# Patient Record
Sex: MALE | Race: WHITE | NOT HISPANIC OR LATINO | Employment: FULL TIME | URBAN - METROPOLITAN AREA
[De-identification: names, ages, dates, MRNs, and addresses within clinical notes are randomized per-mention and may not be internally consistent; named-entity substitution may affect disease eponyms.]

---

## 2018-01-13 NOTE — PROGRESS NOTES
Assessment    1  Hepatitis C (070 70) (B19 20)   2  Hypertension (401 9) (I10)   3  Nicotine dependence (305 1) (F17 200)   4  Atopic dermatitis (691 8) (L20 9)    Plan  Abnormal liver function test, Atopic dermatitis    · Follow-up visit in 6 months Evaluation and Treatment  Follow-up  Status: Hold For - Scheduling   Requested for: 20Jan2016    Discussion/Summary  Discussion Summary:   #1  Hepatitis C  As mentioned patient's medical supplier will be pain for treatment and he will begin this later in the week  Patient will discuss possible side effects and surveillance of treatment with his gastroenterologist  Again no family member has also been found to be positive  #2  Hypertension  Patient's blood pressure showing relatively good control and this will monitor  And advantages the patient has quitting smoking and this may help to reduce his elevated blood pressure  Patient is also under less anxiety now that he knows that he can perceive treatment for his hepatitis  #3  nicotinic dependence  As of today patient states he's quitting smoking and was told if he needs resources to help him quit please call  #4  Atopic dermatitis  Patient is having some improvement with topical steroids but persistent rash still needs further diagnosis  Patient has an appointment in a month to see dermatology  Counseling Documentation With Imm: The patient was counseled regarding diagnostic results, instructions for management, risk factor reductions, prognosis, patient and family education, impressions, risks and benefits of treatment options, importance of compliance with treatment  Medication SE Review and Pt Understands Tx: Possible side effects of new medications were reviewed with the patient/guardian today  The treatment plan was reviewed with the patient/guardian   The patient/guardian understands and agrees with the treatment plan      Chief Complaint  Chief Complaint Free Text Note Form: Patient is here for his 4 week follow up  No labs required for this visit  History of Present Illness  HPI: Patient is a 71-year-old male with a history of borderline hypertension, recently diagnosed with hepatitis C, patient is here today for routine followup after form of period time  Since patient was seen last he has been interviewed by a gastroenterologist in the patient did learn yesterday that he is eligible for treatment for his hepatitis C by his medical provider and he will be getting the medication and the male in the near future  Patient was also relieved to find out that his wife does not have hepatitis C for in general the patient is feeling well with no new complaints or problems   Hospital Based Practices Required Assessment:   Pain Assessment   the patient states they do not have pain  Abuse And Domestic Violence Screen    Yes, the patient is safe at home  The patient states no one is hurting them  Depression And Suicide Screen  No, the patient has not had thoughts of hurting themself  No, the patient has not felt depressed in the past 7 days  Primary Language is  English  Readiness To Learn: Receptive  Barriers To Learning: none  Preferred Learning: verbal   Education Completed: disease/condition, medications and further treatment/follow-up   Teaching Method: verbal   Person Taught: patient   Evaluation Of Learning: verbalized/demonstrated understanding      Review of Systems  Complete-Male:   Constitutional: No fever or chills, feels well, no tiredness, no recent weight gain or weight loss  Eyes: No complaints of eye pain, no red eyes, no discharge from eyes, no itchy eyes  ENT: no complaints of earache, no hearing loss, no nosebleeds, no nasal discharge, no sore throat, no hoarseness  Cardiovascular: No complaints of slow heart rate, no fast heart rate, no chest pain, no palpitations, no leg claudication, no lower extremity     Respiratory: No complaints of shortness of breath, no wheezing, no cough, no SOB on exertion, no orthopnea or PND  Gastrointestinal: No complaints of abdominal pain, no constipation, no nausea or vomiting, no diarrhea or bloody stools  Genitourinary: No complaints of dysuria, no incontinence, no hesitancy, no nocturia, no genital lesion, no testicular pain  Musculoskeletal: arthralgias and joint stiffness, but no joint swelling, no limb pain, no myalgias and no limb swelling  Integumentary: a rash and itching, but as noted in HPI, no dry skin, no skin lesions and no skin wound  Neurological: No compliants of headache, no confusion, no convulsions, no numbness or tingling, no dizziness or fainting, no limb weakness, no difficulty walking  Psychiatric: anxiety, but not suicidal, no personality change, no sleep disturbances and no emotional problems  Endocrine: No complaints of proptosis, no hot flashes, no muscle weakness, no erectile dysfunction, no deepening of the voice, no feelings of weakness  Hematologic/Lymphatic: No complaints of swollen glands, no swollen glands in the neck, does not bleed easily, no easy bruising  Active Problems    1  Abnormal liver function test (790 6) (R79 89)   2  Atopic dermatitis (691 8) (L20 9)   3  Hepatitis C (070 70) (B19 20)   4  Hyperglycemia (790 29) (R73 9)   5  Hypertension (401 9) (I10)   6  Internal derangement of right shoulder (719 81) (M24 111)   7  Nicotine dependence (305 1) (F17 200)   8  Peripheral arterial disease (443 9) (I73 9)   9  Right shoulder pain (719 41) (M25 511)   10  Rotator cuff tear, right (840 4) (M75 101)   11  Sinusitis (473 9) (J32 9)   12  Sprain of shoulder, right (840 9) (S43 401A)   13  Stroke Syndrome (436)   14  Tobacco use (305 1) (Z72 0)   15  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)    Surgical History    1  History of Rotator Cuff Repair   2  History of Shoulder Surgery    Family History    1  Family history of Benign Brain Tumor    2   Family history of Cerebral Artery Aneurysm    Social History    · Alcohol Use (History)   · Denied: History of Drug Use   · Tobacco use (305 1) (Z72 0)   · Tobacco use (305 1) (Z72 0)    Current Meds   1  Aleve 220 MG Oral Tablet; Therapy: (Recorded:25Mar2015) to Recorded   2  Cyclobenzaprine HCl - 10 MG Oral Tablet; TAKE 1 TABLET AT BEDTIME; Therapy: [id number] to [phone] 13)  Requested for: 38XVR4568; Last Rx:25Mar2015;   Status: ACTIVE - Renewal Denied Ordered   3  Fluocinonide 0 05 % External Ointment; APPLY SPARINGLY TO AFFECTED AREA(S) TWICE DAILY; Therapy: [id number] to (Gabriela Beams)  Requested for: 44VCB7272; Last Rx:06Nov2015   Ordered   4  MethylPREDNISolone Acetate 40 MG/ML Injection Suspension; 1 mg was given in the left deltoid IM; To Be Done: [id number]; Status: HOLD FOR - Administration Ordered    Allergies    1  Vicodin TABS    Vitals  Vital Signs [Data Includes: Current Encounter]    Recorded: 36ETR6517 09:16AM   Temperature 95 3 F   Heart Rate 74   Respiration 18   Systolic 286   Diastolic 82   Height 5 ft 8 in   Weight 174 lb 6 08 oz   BMI Calculated 26 51   BSA Calculated 1 93   O2 Saturation 98     Physical Exam    Constitutional Pleasant healthy-appearing 67-year-old male who is awake alert in no acute distress  Eyes   Conjunctiva and lids: No swelling, erythema, or discharge  Pupils and irises: Equal, round and reactive to light  Ears, Nose, Mouth, and Throat   External inspection of ears and nose: Normal     Otoscopic examination: Tympanic membrance translucent with normal light reflex  Canals patent without erythema  Nasal mucosa, septum, and turbinates: Normal without edema or erythema  Oropharynx: Normal with no erythema, edema, exudate or lesions  Pulmonary   Respiratory effort: No increased work of breathing or signs of respiratory distress  Auscultation of lungs: Clear to auscultation, equal breath sounds bilaterally, no wheezes, no rales, no rhonci      Cardiovascular   Palpation of heart: Normal PMI, no thrills  Auscultation of heart: Normal rate and rhythm, normal S1 and S2, without murmurs  Examination of extremities for edema and/or varicosities: Normal     Carotid pulses: Normal     Abdomen   Abdomen: Non-tender, no masses  Liver and spleen: No hepatomegaly or splenomegaly  Lymphatic   Palpation of lymph nodes in neck: No lymphadenopathy  Musculoskeletal   Gait and station: Normal     Digits and nails: Normal without clubbing or cyanosis  Inspection/palpation of joints, bones, and muscles: Normal     Skin   Skin and subcutaneous tissue: Normal without rashes or lesions  Examination of the skin for lesions: Abnormal   Diffuse macular papular raised rash to all parts of his body  There is no sparing of any particular areas  Patient states she's had biopsies obtained in the past were negative  Neurologic   Cranial nerves: Cranial nerves 2-12 intact  Reflexes: 2+ and symmetric  Sensation: No sensory loss      Psychiatric   Orientation to person, place and time: Normal     Mood and affect: Normal          Signatures   Electronically signed by : Oscar Payan DO; Jan 20 2016 10:08AM EST                       (Author)

## 2018-03-09 ENCOUNTER — OFFICE VISIT (OUTPATIENT)
Dept: INTERNAL MEDICINE CLINIC | Facility: CLINIC | Age: 63
End: 2018-03-09
Payer: COMMERCIAL

## 2018-03-09 VITALS
DIASTOLIC BLOOD PRESSURE: 70 MMHG | TEMPERATURE: 98.1 F | WEIGHT: 179 LBS | HEIGHT: 68 IN | BODY MASS INDEX: 27.13 KG/M2 | HEART RATE: 75 BPM | SYSTOLIC BLOOD PRESSURE: 154 MMHG | OXYGEN SATURATION: 97 %

## 2018-03-09 DIAGNOSIS — B19.20 HEPATITIS C VIRUS INFECTION WITHOUT HEPATIC COMA, UNSPECIFIED CHRONICITY: ICD-10-CM

## 2018-03-09 DIAGNOSIS — F41.1 GENERALIZED ANXIETY DISORDER: ICD-10-CM

## 2018-03-09 DIAGNOSIS — F17.210 CIGARETTE NICOTINE DEPENDENCE WITHOUT COMPLICATION: ICD-10-CM

## 2018-03-09 DIAGNOSIS — L20.84 INTRINSIC ATOPIC DERMATITIS: ICD-10-CM

## 2018-03-09 DIAGNOSIS — I10 ESSENTIAL HYPERTENSION: Primary | ICD-10-CM

## 2018-03-09 DIAGNOSIS — R21 SKIN RASH: ICD-10-CM

## 2018-03-09 PROCEDURE — 99213 OFFICE O/P EST LOW 20 MIN: CPT | Performed by: INTERNAL MEDICINE

## 2018-03-09 RX ORDER — CEPHALEXIN 500 MG/1
500 CAPSULE ORAL EVERY 12 HOURS SCHEDULED
Qty: 20 CAPSULE | Refills: 0 | Status: SHIPPED | OUTPATIENT
Start: 2018-03-09 | End: 2018-03-19

## 2018-03-09 NOTE — ASSESSMENT & PLAN NOTE
Hypertension  As noted patient's blood pressure is mildly elevated  Patient will have this checked in approximately 2-3 weeks with his next visit and if it is still elevated the nurse no abnormalities with lab test otherwise we will initiate treatment

## 2018-03-09 NOTE — PROGRESS NOTES
Assessment/Plan:      Diagnoses and all orders for this visit:    Essential hypertension  -     Comprehensive metabolic panel; Future  -     CBC and differential; Future    Skin rash  -     Ambulatory referral to Dermatology; Future  -     cephalexin (KEFLEX) 500 mg capsule; Take 1 capsule (500 mg total) by mouth every 12 (twelve) hours for 10 days    Hepatitis C virus infection without hepatic coma, unspecified chronicity    Intrinsic atopic dermatitis    Generalized anxiety disorder    Cigarette nicotine dependence without complication          Subjective:     Patient ID: Yuli Acevedo is a 65 y o  male  Patient is a 71-year-old male with a history of benign essential hypertension, history of hepatitis C infection in the past now in remission, history of anxiety, chronic tobacco abuse  Patient is here today for evaluation of a rash diffusely throughout his body  He has had this rash before and was original Work workup did shows liver function abnormalities and finally a diagnosis of hepatitis-C he is now in remission but still has the rash  He states his pruritic and occasionally he has little pustules to the skin surface  It does not seem to be seasonal   He has been on systemic corticosteroids without relief  Review of Systems   Constitutional: Negative  HENT: Negative  Eyes: Negative  Respiratory: Negative  Cardiovascular: Negative  Gastrointestinal: Negative  Endocrine: Negative  Genitourinary: Negative  Musculoskeletal: Negative  Skin: Positive for rash  Negative for color change, pallor and wound  Allergic/Immunologic: Negative  Neurological: Negative  Hematological: Negative  Psychiatric/Behavioral: Negative  Objective:     Physical Exam   Constitutional: He is oriented to person, place, and time  He appears well-developed and well-nourished     Pleasant, mildly anxious 71-year-old male who is awake alert no acute distress   HENT:   Head: Normocephalic and atraumatic  Right Ear: External ear normal    Left Ear: External ear normal    Nose: Nose normal    Mouth/Throat: Oropharynx is clear and moist    Eyes: Conjunctivae and EOM are normal  Pupils are equal, round, and reactive to light  Neck: Normal range of motion  Neck supple  Cardiovascular: Normal rate, regular rhythm and intact distal pulses  Pulmonary/Chest: Effort normal    Patient has slightly decreased breath sounds anterior and posteriorly but no rales rhonchi or wheezes   Abdominal: Soft  Bowel sounds are normal    Musculoskeletal: Normal range of motion  Neurological: He is alert and oriented to person, place, and time  He has normal reflexes  Skin: Skin is warm  Rash noted  No erythema  No pallor  Psychiatric: He has a normal mood and affect  His behavior is normal  Judgment and thought content normal    Nursing note and vitals reviewed

## 2018-03-09 NOTE — ASSESSMENT & PLAN NOTE
Hepatitis-C  Again with workup and evaluation for his dermatitis in the past we did discover the patient had hepatitis C and patient was seen by a gastroenterologist and initiated treatment  Patient is now considered disease-free  We will be checking on his liver function tests with upcoming labs

## 2018-03-09 NOTE — ASSESSMENT & PLAN NOTE
Nicotine dependence  Patient is still smoking  He was told the importance of quitting now    We will discuss this again with his next visit

## 2018-03-09 NOTE — ASSESSMENT & PLAN NOTE
Atopic dermatitis, skin rash  Patient was seen previously in our office for diffuse skin rash which is pruritic  Patient states that he develops small pustules and his rash will come and go  He states it has been particularly difficult this winter  With initial workup and evaluation patient did have routine labs performed which showed elevated liver function test consistent with then diagnosis hepatitis-C  Patient did have some systemic steroids which did not help his illness  We did discuss different ways to work on the skin rash including using different detergents and soaps at home which she will attempt to do  Patient was also placed on oral antibiotic treatment to see if this is a chronic staff type infection  He will go for routine labs and we will discuss the results when he comes back approximately 2 weeks  We have also made an appointment to be seen by a dermatologist and this may take an extended period of time so this was initiated early and we felt that we could cancel the appointment if he has a cure

## 2018-04-02 ENCOUNTER — OFFICE VISIT (OUTPATIENT)
Dept: INTERNAL MEDICINE CLINIC | Facility: CLINIC | Age: 63
End: 2018-04-02
Payer: COMMERCIAL

## 2018-04-02 VITALS
BODY MASS INDEX: 26.22 KG/M2 | OXYGEN SATURATION: 97 % | WEIGHT: 173 LBS | TEMPERATURE: 97.4 F | SYSTOLIC BLOOD PRESSURE: 138 MMHG | HEART RATE: 75 BPM | HEIGHT: 68 IN | DIASTOLIC BLOOD PRESSURE: 80 MMHG

## 2018-04-02 DIAGNOSIS — L20.84 INTRINSIC ATOPIC DERMATITIS: ICD-10-CM

## 2018-04-02 DIAGNOSIS — B19.20 HEPATITIS C VIRUS INFECTION WITHOUT HEPATIC COMA, UNSPECIFIED CHRONICITY: Primary | ICD-10-CM

## 2018-04-02 DIAGNOSIS — F41.1 GENERALIZED ANXIETY DISORDER: ICD-10-CM

## 2018-04-02 DIAGNOSIS — I10 ESSENTIAL HYPERTENSION: ICD-10-CM

## 2018-04-02 DIAGNOSIS — Z12.11 SCREEN FOR COLON CANCER: ICD-10-CM

## 2018-04-02 DIAGNOSIS — Z12.5 PROSTATE CANCER SCREENING: ICD-10-CM

## 2018-04-02 DIAGNOSIS — F17.210 CIGARETTE NICOTINE DEPENDENCE WITHOUT COMPLICATION: ICD-10-CM

## 2018-04-02 DIAGNOSIS — R35.0 URINARY FREQUENCY: ICD-10-CM

## 2018-04-02 PROCEDURE — 99214 OFFICE O/P EST MOD 30 MIN: CPT | Performed by: INTERNAL MEDICINE

## 2018-04-02 NOTE — PROGRESS NOTES
Assessment/Plan:    Hypertension  Hypertension  As noted patient's blood pressure was moderately elevated with his last visit  Has noted patient's blood pressure today is showing better control and patient will remain off the medication at this point in time  He did have routine labs performed prior to the visit today showing no abnormalities  Hepatitis C without hepatic coma  Patient does have a history in the past of hepatitis-C and did go through complete treatment cycle  Patient has no elevation in any of his liver function tests    Atopic dermatitis  Atopic dermatitis  Patient has finally had an appointment to be seen and evaluation by the dermatologist   Patient was placed on try minutes alone cream and patient states he has had almost complete resolution of his rash and pruritus    Nicotine dependence  Nicotine dependence  We did have a long discussion today about quitting smoking  Patient is willing to try the nicotine patch  We did give him samples of the patch with a coupon for these  He was told if any adverse reaction to the medication to please call  He was told another option would be to place him on Chantix and we have had some success with this medication previously  Diagnoses and all orders for this visit:    Hepatitis C virus infection without hepatic coma, unspecified chronicity    Essential hypertension    Intrinsic atopic dermatitis    Generalized anxiety disorder    Cigarette nicotine dependence without complication    Prostate cancer screening  -     PSA Total, Diagnostic; Future    Urinary frequency  -     Urinalysis with microscopic    Screen for colon cancer  -     Occult blood 1-3, stool; Future    Other orders  -     triamcinolone (KENALOG) 0 1 % ointment;           Subjective:      Patient ID: Beba Chawla is a 65 y o  male  Patient is a 61-year-old male with a history of multiple medical problems as outlined previously    Patient is here today for follow-up regarding his blood pressure, disseminated atopic dermatitis, nicotine dependence  The following portions of the patient's history were reviewed and updated as appropriate:   He  has no past medical history on file  He   Patient Active Problem List    Diagnosis Date Noted    Generalized anxiety disorder 09/02/2016    Hepatitis C without hepatic coma 12/28/2015    Hypertension 11/06/2015    Atopic dermatitis 11/06/2015    Nicotine dependence 08/23/2012     He  has no past surgical history on file  His family history is not on file  He  has no tobacco, alcohol, and drug history on file  Current Outpatient Prescriptions   Medication Sig Dispense Refill    triamcinolone (KENALOG) 0 1 % ointment        No current facility-administered medications for this visit  No current outpatient prescriptions on file prior to visit  No current facility-administered medications on file prior to visit  He is allergic to hydrocodone-acetaminophen       Review of Systems   Constitutional: Negative for activity change, appetite change, chills, diaphoresis, fatigue, fever and unexpected weight change  HENT: Negative for congestion, dental problem, drooling, ear discharge, ear pain, facial swelling, hearing loss, mouth sores, nosebleeds, postnasal drip, rhinorrhea, sinus pain, sinus pressure, sneezing, sore throat, tinnitus, trouble swallowing and voice change  Eyes: Negative for photophobia, pain, discharge, redness, itching and visual disturbance  Respiratory: Positive for cough (Episodic cough from chronic tobacco abuse)  Negative for apnea, choking, chest tightness, shortness of breath, wheezing and stridor  Cardiovascular: Negative for chest pain, palpitations and leg swelling  Gastrointestinal: Negative for abdominal distention, abdominal pain, anal bleeding, blood in stool, constipation, diarrhea, nausea, rectal pain and vomiting     Endocrine: Negative for cold intolerance, heat intolerance, polydipsia, polyphagia and polyuria  Genitourinary: Negative for difficulty urinating, discharge, dysuria, enuresis, flank pain, frequency, genital sores, hematuria, penile pain, penile swelling, scrotal swelling, testicular pain and urgency  Musculoskeletal: Negative for arthralgias, back pain, gait problem, joint swelling, myalgias, neck pain and neck stiffness  Skin: Positive for rash ( patient states that he has had almost complete resolution of his rash with treatment as proposed by his dermatologist)  Negative for color change, pallor and wound  Allergic/Immunologic: Negative for environmental allergies, food allergies and immunocompromised state  Neurological: Negative  Hematological: Negative  Psychiatric/Behavioral: Negative for agitation, behavioral problems, confusion, decreased concentration, dysphoric mood, hallucinations, self-injury, sleep disturbance and suicidal ideas  The patient is nervous/anxious ( patient states that much of his anxiety was based on problems with his recurrent rash and since this has improved he is feeling much better emotionally as well)  The patient is not hyperactive  Objective:      /80   Pulse 75   Temp (!) 97 4 °F (36 3 °C)   Ht 5' 8" (1 727 m)   Wt 78 5 kg (173 lb)   SpO2 97%   BMI 26 30 kg/m²          Physical Exam   Constitutional: He is oriented to person, place, and time  He appears well-developed and well-nourished  Pleasant, healthy-appearing 41-year-old male who is awake alert in no acute distress   HENT:   Head: Normocephalic and atraumatic  Right Ear: External ear normal    Left Ear: External ear normal    Nose: Nose normal    Mouth/Throat: Oropharynx is clear and moist    Eyes: Conjunctivae and EOM are normal  Pupils are equal, round, and reactive to light  Neck: Normal range of motion  Neck supple  Cardiovascular: Normal rate, regular rhythm, normal heart sounds and intact distal pulses      Pulmonary/Chest: No respiratory distress  He has no wheezes  He has no rales  He exhibits no tenderness  Patient has some decreased breath sounds anterior and posteriorly but no rales rhonchi or wheezes were appreciated on exam   Abdominal: Soft  Bowel sounds are normal    Genitourinary:   Genitourinary Comments: Patient was interested in evaluation of his prostatic health but refused rectal examination   Musculoskeletal: Normal range of motion  He exhibits no edema, tenderness or deformity  Neurological: He is alert and oriented to person, place, and time  Skin: Skin is warm and dry  Psychiatric: He has a normal mood and affect  His behavior is normal  Judgment and thought content normal    Nursing note and vitals reviewed

## 2018-04-02 NOTE — ASSESSMENT & PLAN NOTE
Nicotine dependence  We did have a long discussion today about quitting smoking  Patient is willing to try the nicotine patch  We did give him samples of the patch with a coupon for these  He was told if any adverse reaction to the medication to please call  He was told another option would be to place him on Chantix and we have had some success with this medication previously

## 2018-04-02 NOTE — ASSESSMENT & PLAN NOTE
Hypertension  As noted patient's blood pressure was moderately elevated with his last visit  Has noted patient's blood pressure today is showing better control and patient will remain off the medication at this point in time  He did have routine labs performed prior to the visit today showing no abnormalities

## 2018-04-02 NOTE — ASSESSMENT & PLAN NOTE
Atopic dermatitis    Patient has finally had an appointment to be seen and evaluation by the dermatologist   Patient was placed on try minutes alone cream and patient states he has had almost complete resolution of his rash and pruritus

## 2018-04-02 NOTE — ASSESSMENT & PLAN NOTE
Patient does have a history in the past of hepatitis-C and did go through complete treatment cycle    Patient has no elevation in any of his liver function tests

## 2018-08-13 ENCOUNTER — TELEPHONE (OUTPATIENT)
Dept: INTERNAL MEDICINE CLINIC | Facility: CLINIC | Age: 63
End: 2018-08-13

## 2018-08-13 DIAGNOSIS — N32.89 BLADDER MASS: Primary | ICD-10-CM

## 2018-08-13 NOTE — TELEPHONE ENCOUNTER
Patient had appendicitis last Thursday and went to ER, he ended up having his appendic's taken out and in the process they found a mass that he would like to discuss with you    Please call him today he is really anxious to speak with you

## 2018-08-15 ENCOUNTER — TELEPHONE (OUTPATIENT)
Dept: UROLOGY | Facility: AMBULATORY SURGERY CENTER | Age: 63
End: 2018-08-15

## 2018-08-15 ENCOUNTER — OFFICE VISIT (OUTPATIENT)
Dept: INTERNAL MEDICINE CLINIC | Facility: CLINIC | Age: 63
End: 2018-08-15
Payer: COMMERCIAL

## 2018-08-15 ENCOUNTER — TRANSITIONAL CARE MANAGEMENT (OUTPATIENT)
Dept: INTERNAL MEDICINE CLINIC | Facility: CLINIC | Age: 63
End: 2018-08-15

## 2018-08-15 ENCOUNTER — TELEPHONE (OUTPATIENT)
Dept: INTERNAL MEDICINE CLINIC | Facility: CLINIC | Age: 63
End: 2018-08-15

## 2018-08-15 VITALS
HEART RATE: 70 BPM | HEIGHT: 68 IN | WEIGHT: 175 LBS | TEMPERATURE: 97.1 F | BODY MASS INDEX: 26.52 KG/M2 | DIASTOLIC BLOOD PRESSURE: 70 MMHG | OXYGEN SATURATION: 96 % | SYSTOLIC BLOOD PRESSURE: 138 MMHG | RESPIRATION RATE: 16 BRPM

## 2018-08-15 DIAGNOSIS — I10 ESSENTIAL HYPERTENSION: Primary | ICD-10-CM

## 2018-08-15 DIAGNOSIS — Z90.49 STATUS POST APPENDECTOMY: ICD-10-CM

## 2018-08-15 DIAGNOSIS — N32.89 BLADDER MASS: ICD-10-CM

## 2018-08-15 PROCEDURE — 99496 TRANSJ CARE MGMT HIGH F2F 7D: CPT | Performed by: NURSE PRACTITIONER

## 2018-08-15 NOTE — PROGRESS NOTES
Assessment/Plan:    Hypertension  Blood pressure stable, pt not taking any medications  Status post appendectomy  Pt is recovering well from surgery  Incisions are clean and dry  He does have significant bruising but this appears to be consistent with localized trauma secondary to his procedure  Pt is scheduled to follow up with surgery next week  Pt advised to take pain medication as needed; he states his discharge instructions were for ibuprofen  Bladder mass  Referral to [facility] urology provided, office staff assisted pt to call and try to schedule appointment  Diagnoses and all orders for this visit:    Essential hypertension    Status post appendectomy    Bladder mass          Subjective:      Patient ID: Manju Jerome is a 65 y o  male  Pt is a 65y o  year old male who is here for TCM visit following hospitalization at [facility] from 8/9-8/10 with discharge to Home   Admission diagnosis was acute appendicitis  Discharge medications reviewed yes  Pt is experiencing symptoms of tenderness and bruising at his incision site  He has not taken any pain medication  He denies fever/chills, nausea/vomiting, diarrhea/constipation  Appetite is normal   An incidental finding of a bladder mass was identified on CT done during his hospitalization and was referred for cystoscopy with urology  Report does identify this mass as suspicious for malignancy  Pt has had hematuria in the past             The following portions of the patient's history were reviewed and updated as appropriate: allergies, current medications, past family history, past medical history, past social history, past surgical history and problem list     Review of Systems   Constitutional: Negative for activity change, appetite change, chills, fatigue, fever and unexpected weight change  HENT: Negative for congestion and hearing loss  Eyes: Negative for visual disturbance     Respiratory: Negative for cough, chest tightness and shortness of breath  Cardiovascular: Negative for chest pain, palpitations and leg swelling  Gastrointestinal: Positive for abdominal pain  Negative for constipation, diarrhea, nausea and vomiting  Genitourinary: Positive for hematuria  Negative for dysuria and frequency  Musculoskeletal: Negative for arthralgias and myalgias  Skin: Positive for color change (bruising) and wound  Allergic/Immunologic: Negative for environmental allergies  Neurological: Negative for dizziness, weakness, numbness and headaches  Psychiatric/Behavioral: Negative for sleep disturbance  The patient is not nervous/anxious  Objective:      /70 (BP Location: Left arm, Patient Position: Sitting, Cuff Size: Large)   Pulse 70   Temp (!) 97 1 °F (36 2 °C)   Resp 16   Ht 5' 8" (1 727 m)   Wt 79 4 kg (175 lb)   SpO2 96%   BMI 26 61 kg/m²          Physical Exam   Constitutional: He is oriented to person, place, and time  Vital signs are normal  He appears well-developed and well-nourished  He is cooperative  HENT:   Right Ear: Hearing, tympanic membrane, external ear and ear canal normal    Left Ear: Hearing, tympanic membrane, external ear and ear canal normal    Nose: Nose normal    Mouth/Throat: Oropharynx is clear and moist and mucous membranes are normal    Eyes: Conjunctivae and lids are normal  Pupils are equal, round, and reactive to light  Neck: Normal range of motion and full passive range of motion without pain  No JVD present  Carotid bruit is not present  Cardiovascular: Normal rate, regular rhythm, S1 normal, S2 normal, normal heart sounds and intact distal pulses  No murmur heard  Pulmonary/Chest: Effort normal and breath sounds normal    Abdominal: Soft  Bowel sounds are normal  There is tenderness in the left lower quadrant  Incisions as noted on diagram   All are covered in clear adhesive dressings  No abnormal discharge, no increased warmth    The LLQ incision is tender with significant bruising around it that tracks around the L hip to the back  Mild tenderness to palpation over incision site  Musculoskeletal: Normal range of motion  He exhibits no edema  Lymphadenopathy:     He has no cervical adenopathy  Neurological: He is alert and oriented to person, place, and time  He has normal strength  No sensory deficit  Skin: Skin is warm, dry and intact  Ecchymosis and lesion noted  Deep purple bruising as noted on diagram   Surgical incisions on abdomen are clean and dry with clear adhesive dressings  Psychiatric: He has a normal mood and affect  His speech is normal and behavior is normal  Judgment and thought content normal  Cognition and memory are normal    Vitals reviewed  Date and time hospital follow up call was made:  8/15/2018  1:10 PM  Hospital care reviewed:  Records reviewed  Patient was hopsitalized at:  [facility]  Date of admission:  8/9/18  Date of discharge:  8/10/18  Diagnosis:  Appendix removal  Disposition:  Home  Current symptoms:  Back pain - left side, Back pain - right side, Incisional pain  Back pain, left side, severity:  Moderate  Back pain, left side, onset:  Progressive  Incisional pain severity:  Moderate  Back pain, right side, severity:  Moderate  Back pain, right side, onset:  Progressive  Scheduled for follow up?:  Yes  I have advised the patient to call PCP with any new or worsening symptoms (please type in name along with any credentials):   Nathan Head  Comments:  Patient appt scheduled for 8/15/18

## 2018-08-15 NOTE — TELEPHONE ENCOUNTER
Reason for appointment/Complaint/Diagnosis : mass on bladder (cat scan in Care Everywhere)     Insurance: [employer]     History of Cancer? i spoke with PCPs office   If yes, what kind? Previous urologist?     unknown                   Records requested/where? Records in [address] [place] [address] Rd     Outside testing/where? In Epic     Location Preference for office visit?  [place]

## 2018-08-15 NOTE — TELEPHONE ENCOUNTER
Pt asked for a recommendation of the physician regarding the bladder procedure that he has to undergo  He asked for a call back at [phone] when possible

## 2018-08-15 NOTE — ASSESSMENT & PLAN NOTE
Blood pressure stable, pt not taking any medications 
Pt is recovering well from surgery  Incisions are clean and dry  He does have significant bruising but this appears to be consistent with localized trauma secondary to his procedure  Pt is scheduled to follow up with surgery next week  Pt advised to take pain medication as needed; he states his discharge instructions were for ibuprofen 
Referral to [facility] provided, office staff assisted pt to call and try to schedule appointment 
52.5

## 2018-08-15 NOTE — TELEPHONE ENCOUNTER
Patient scheduled for Friday, 8/17/18 11:30 at [place] office with Dr Maulik Palacio  Patient instructed to arrive 15-20 minutes early to complete new patient paperwork       Patient instructed to bring CT disk to appointment

## 2018-08-17 ENCOUNTER — OFFICE VISIT (OUTPATIENT)
Dept: UROLOGY | Facility: AMBULATORY SURGERY CENTER | Age: 63
End: 2018-08-17
Payer: COMMERCIAL

## 2018-08-17 VITALS
WEIGHT: 176.8 LBS | DIASTOLIC BLOOD PRESSURE: 68 MMHG | SYSTOLIC BLOOD PRESSURE: 134 MMHG | HEART RATE: 72 BPM | BODY MASS INDEX: 26.8 KG/M2 | HEIGHT: 68 IN

## 2018-08-17 DIAGNOSIS — N32.89 BLADDER MASS: Primary | ICD-10-CM

## 2018-08-17 LAB
BACTERIA UR QL AUTO: ABNORMAL /HPF
BILIRUB UR QL STRIP: NEGATIVE
CLARITY UR: CLEAR
COLOR UR: YELLOW
GLUCOSE UR STRIP-MCNC: ABNORMAL MG/DL
HGB UR QL STRIP.AUTO: NEGATIVE
HYALINE CASTS #/AREA URNS LPF: ABNORMAL /LPF
KETONES UR STRIP-MCNC: NEGATIVE MG/DL
LEUKOCYTE ESTERASE UR QL STRIP: ABNORMAL
NITRITE UR QL STRIP: NEGATIVE
NON-SQ EPI CELLS URNS QL MICRO: ABNORMAL /HPF
PH UR STRIP.AUTO: 5.5 [PH] (ref 4.5–8)
PROT UR STRIP-MCNC: NEGATIVE MG/DL
RBC #/AREA URNS AUTO: ABNORMAL /HPF
SL AMB  POCT GLUCOSE, UA: 1000
SL AMB LEUKOCYTE ESTERASE,UA: NORMAL
SL AMB POCT BLOOD,UA: NORMAL
SL AMB POCT CLARITY,UA: CLEAR
SL AMB POCT COLOR,UA: YELLOW
SL AMB POCT KETONES,UA: NORMAL
SL AMB POCT NITRITE,UA: NORMAL
SL AMB POCT PH,UA: 5
SL AMB POCT SPECIFIC GRAVITY,UA: 1.01
SL AMB POCT URINE PROTEIN: NORMAL
SP GR UR STRIP.AUTO: 1.03 (ref 1–1.03)
UROBILINOGEN UR QL STRIP.AUTO: 0.2 E.U./DL
WBC #/AREA URNS AUTO: ABNORMAL /HPF

## 2018-08-17 PROCEDURE — 87086 URINE CULTURE/COLONY COUNT: CPT | Performed by: UROLOGY

## 2018-08-17 PROCEDURE — 88112 CYTOPATH CELL ENHANCE TECH: CPT | Performed by: PATHOLOGY

## 2018-08-17 PROCEDURE — 81002 URINALYSIS NONAUTO W/O SCOPE: CPT | Performed by: UROLOGY

## 2018-08-17 PROCEDURE — 99244 OFF/OP CNSLTJ NEW/EST MOD 40: CPT | Performed by: UROLOGY

## 2018-08-17 PROCEDURE — 81001 URINALYSIS AUTO W/SCOPE: CPT | Performed by: UROLOGY

## 2018-08-17 PROCEDURE — 52000 CYSTOURETHROSCOPY: CPT | Performed by: UROLOGY

## 2018-08-17 NOTE — LETTER
August 17, 2018     [place] DO [place]  [address]  [address] [place] [id number]    Patient: Pastora    YOB: 1955   Date of Visit: 8/17/2018       Dear Dr Scott Rao: Thank you for referring Pastora  to me for evaluation  Below are my notes for this consultation  If you have questions, please do not hesitate to call me  I look forward to following your patient along with you  Sincerely,        Robin Loredo MD        CC: No Recipients  Robin Loredo MD  8/17/2018 12:10 PM  Sign at close encounter  8/17/2018    Pastora   1955  [id number]        Assessment  Urothelial carcinoma the bladder    Plan  We discussed the findings at length  We reviewed his CT scan findings as well as his cystoscopic findings  They understand that he has urothelial carcinoma of the bladder  Fortunately the rest of his CT scan appeared normal   We discussed the technique, risks, benefits for cystoscopy with trans urethral resection  I would also recommend left ureteral catheter placement intraoperatively in order to protect his left ureter during the resection  He understands that this will be removed, however he may require Toscano catheterization postoperatively depending on the degree of resection necessary  All questions answered to their satisfaction  Consent was obtained for cystoscopy, transurethral resection bladder tumor, left ureteral catheterization  History of Present Illness  Gal Beauchamp is a 65 y o  male referred for evaluation of bladder lesion  He developed severe acute abdominal pain on 8/9/2018  He went to [address] [place] [address] in CT scan was performed  This revealed acute appendicitis as well as a 2 cm enhancing nodular mass in the bladder  Patient has a greater than 40 pack year smoking history  He denies any gross hematuria or other urinary symptoms  He has had prostate screening in the past with his primary care physician    His last PSA in 2016 was 0 4     I reviewed his CT images on disc, which reveals 2 cm enhancing lesion in the bladder, left posterior wall  There is no obvious urothelial lesion anywhere else in the urinary tract  Cystoscopy  Date/Time: 8/17/2018 11:51 AM  Performed by: Arsalan Gotti  Authorized by: Arsalan Gotti     Additional Procedure Details: Flexible cystoscopy note     The patient was prepped and draped in sterile fashion  2% lidocaine jelly was instilled per urethra   The 16 Indian flexible cystoscope was placed per urethra  No urethral or prostatic obstruction  Evaluation of the bladder reveals bilateral normal ureteral orifices  Adjacent to the left ureteral orifice, there is a greater than 2 cm papillary lesion along with multiple satellite lesions  The rest of the bladder was carefully evaluated and there were no other lesions noted   The scope was removed and the procedure was terminated  The patient tolerated the procedure well  Review of Systems  Review of Systems   Constitutional: Negative  HENT: Negative  Respiratory: Negative  Cardiovascular: Negative  Gastrointestinal: Negative  Genitourinary:        As per HPI   Musculoskeletal: Negative  Skin: Negative  Neurological: Negative  Hematological: Negative  Past Medical History  History reviewed  No pertinent past medical history  Past Social History  Past Surgical History:   Procedure Laterality Date    APPENDECTOMY         Past Family History  History reviewed  No pertinent family history  Past Social history  Social History     Social History    Marital status: /Civil Union     Spouse name: N/A    Number of children: N/A    Years of education: N/A     Occupational History    Not on file       Social History Main Topics    Smoking status: Current Every Day Smoker    Smokeless tobacco: Never Used    Alcohol use Yes      Comment: social    Drug use: No    Sexual activity: Not on file Other Topics Concern    Not on file     Social History Narrative    No narrative on file     History   Smoking Status    Current Every Day Smoker   Smokeless Tobacco    Never Used       Current Medications  No current outpatient prescriptions on file  No current facility-administered medications for this visit  Allergies  Allergies   Allergen Reactions    Hydrocodone-Acetaminophen        Past Medical History, Social History, Family History, medications and allergies were reviewed  Vitals  Vitals:    08/17/18 1112   BP: 134/68   Pulse: 72   Weight: 80 2 kg (176 lb 12 8 oz)   Height: 5' 8" (1 727 m)       Physical Exam  Physical Exam   Constitutional: He is oriented to person, place, and time  He appears well-developed and well-nourished  Cardiovascular: Normal rate and regular rhythm  Pulmonary/Chest: Effort normal and breath sounds normal    Abdominal: Soft  Genitourinary: Penis normal    Musculoskeletal: Normal range of motion  Neurological: He is alert and oriented to person, place, and time  Skin: Skin is warm, dry and intact  Psychiatric: He has a normal mood and affect  Vitals reviewed          Results  No results found for: PSA  Lab Results   Component Value Date    CALCIUM 9 4 08/18/2016     08/18/2016    K 4 3 08/18/2016    CO2 26 08/18/2016     08/18/2016    BUN 16 08/18/2016    CREATININE 0 90 08/18/2016     Lab Results   Component Value Date    WBC 8 3 08/18/2016    HGB 16 1 08/18/2016    HCT 50 1 (H) 08/18/2016    MCV 94 9 08/18/2016     08/18/2016

## 2018-08-17 NOTE — PROGRESS NOTES
8/17/2018    Beba Chawla  1955  [id number]        Assessment  Urothelial carcinoma the bladder    Plan  We discussed the findings at length  We reviewed his CT scan findings as well as his cystoscopic findings  They understand that he has urothelial carcinoma of the bladder  Fortunately the rest of his CT scan appeared normal   We discussed the technique, risks, benefits for cystoscopy with trans urethral resection  I would also recommend left ureteral catheter placement intraoperatively in order to protect his left ureter during the resection  He understands that this will be removed, however he may require Toscano catheterization postoperatively depending on the degree of resection necessary  All questions answered to their satisfaction  Consent was obtained for cystoscopy, transurethral resection bladder tumor, left ureteral catheterization  History of Present Illness  Katty Santana is a 65 y o  male referred for evaluation of bladder lesion  He developed severe acute abdominal pain on 8/9/2018  He went to [address] [place] [address] in CT scan was performed  This revealed acute appendicitis as well as a 2 cm enhancing nodular mass in the bladder  Patient has a greater than 40 pack year smoking history  He denies any gross hematuria or other urinary symptoms  He has had prostate screening in the past with his primary care physician  His last PSA in 2016 was 0 4  I reviewed his CT images on disc, which reveals 2 cm enhancing lesion in the bladder, left posterior wall  There is no obvious urothelial lesion anywhere else in the urinary tract  Cystoscopy  Date/Time: 8/17/2018 11:51 AM  Performed by: Warren Reis  Authorized by: Warern Reis     Additional Procedure Details: Flexible cystoscopy note     The patient was prepped and draped in sterile fashion  2% lidocaine jelly was instilled per urethra   The 16 Dominican flexible cystoscope was placed per urethra    No urethral or prostatic obstruction  Evaluation of the bladder reveals bilateral normal ureteral orifices  Adjacent to the left ureteral orifice, there is a greater than 2 cm papillary lesion along with multiple satellite lesions  The rest of the bladder was carefully evaluated and there were no other lesions noted   The scope was removed and the procedure was terminated  The patient tolerated the procedure well  Review of Systems  Review of Systems   Constitutional: Negative  HENT: Negative  Respiratory: Negative  Cardiovascular: Negative  Gastrointestinal: Negative  Genitourinary:        As per HPI   Musculoskeletal: Negative  Skin: Negative  Neurological: Negative  Hematological: Negative  Past Medical History  History reviewed  No pertinent past medical history  Past Social History  Past Surgical History:   Procedure Laterality Date    APPENDECTOMY         Past Family History  History reviewed  No pertinent family history  Past Social history  Social History     Social History    Marital status: /Civil Union     Spouse name: N/A    Number of children: N/A    Years of education: N/A     Occupational History    Not on file  Social History Main Topics    Smoking status: Current Every Day Smoker    Smokeless tobacco: Never Used    Alcohol use Yes      Comment: social    Drug use: No    Sexual activity: Not on file     Other Topics Concern    Not on file     Social History Narrative    No narrative on file     History   Smoking Status    Current Every Day Smoker   Smokeless Tobacco    Never Used       Current Medications  No current outpatient prescriptions on file  No current facility-administered medications for this visit  Allergies  Allergies   Allergen Reactions    Hydrocodone-Acetaminophen        Past Medical History, Social History, Family History, medications and allergies were reviewed      Vitals  Vitals:    08/17/18 1112   BP: 134/68 Pulse: 72   Weight: 80 2 kg (176 lb 12 8 oz)   Height: 5' 8" (1 727 m)       Physical Exam  Physical Exam   Constitutional: He is oriented to person, place, and time  He appears well-developed and well-nourished  Cardiovascular: Normal rate and regular rhythm  Pulmonary/Chest: Effort normal and breath sounds normal    Abdominal: Soft  Genitourinary: Penis normal    Musculoskeletal: Normal range of motion  Neurological: He is alert and oriented to person, place, and time  Skin: Skin is warm, dry and intact  Psychiatric: He has a normal mood and affect  Vitals reviewed          Results  No results found for: PSA  Lab Results   Component Value Date    CALCIUM 9 4 08/18/2016     08/18/2016    K 4 3 08/18/2016    CO2 26 08/18/2016     08/18/2016    BUN 16 08/18/2016    CREATININE 0 90 08/18/2016     Lab Results   Component Value Date    WBC 8 3 08/18/2016    HGB 16 1 08/18/2016    HCT 50 1 (H) 08/18/2016    MCV 94 9 08/18/2016     08/18/2016

## 2018-08-18 LAB — BACTERIA UR CULT: NORMAL

## 2018-08-22 NOTE — PRE-PROCEDURE INSTRUCTIONS
No outpatient prescriptions have been marked as taking for the 9/12/18 encounter [facility] Encounter)  Pre op and bathing instructions reviewed

## 2018-08-29 ENCOUNTER — ANESTHESIA EVENT (OUTPATIENT)
Dept: PERIOP | Facility: AMBULARY SURGERY CENTER | Age: 63
End: 2018-08-29
Payer: COMMERCIAL

## 2018-09-04 ENCOUNTER — OFFICE VISIT (OUTPATIENT)
Dept: INTERNAL MEDICINE CLINIC | Facility: CLINIC | Age: 63
End: 2018-09-04
Payer: COMMERCIAL

## 2018-09-04 VITALS
BODY MASS INDEX: 27.28 KG/M2 | DIASTOLIC BLOOD PRESSURE: 80 MMHG | HEART RATE: 78 BPM | WEIGHT: 180 LBS | RESPIRATION RATE: 14 BRPM | HEIGHT: 68 IN | TEMPERATURE: 97.8 F | OXYGEN SATURATION: 98 % | SYSTOLIC BLOOD PRESSURE: 138 MMHG

## 2018-09-04 DIAGNOSIS — F17.210 CIGARETTE NICOTINE DEPENDENCE WITHOUT COMPLICATION: ICD-10-CM

## 2018-09-04 DIAGNOSIS — N32.89 BLADDER MASS: ICD-10-CM

## 2018-09-04 DIAGNOSIS — Z01.818 PREOP EXAMINATION: Primary | ICD-10-CM

## 2018-09-04 PROCEDURE — 99214 OFFICE O/P EST MOD 30 MIN: CPT | Performed by: INTERNAL MEDICINE

## 2018-09-04 NOTE — PROGRESS NOTES
Assessment/Plan:    Bladder mass  Patient has gone to the urologist for initial evaluation of hyper ladder mass that was seen when patient had an acute appendicitis recently  The decision was made to do a trans urethral resection for what was noted to be a carcinoma  Patient is here for preop evaluation  Patient states in general he is anxious understandably about the procedure to be performed and he is worried possibly about metastatic disease  He is reassured that they have found this particular cancer very early on and hopefully they will be able to resect completely  Patient will be going for some lab studies including his CBC, basic metabolic profile and a protime with an INR  Patient was evaluated in the office today and was felt to be medically clear for the procedure that will take place as long as labs are normal    Nicotine dependence  We did discuss with the patient that 1 of the risk factors for him developing this particular carcinoma is his history of tobacco abuse for an extended period of time  He was told he needs to quit now prior to the surgery rather than to weight till afterwards  Hepatitis C without hepatic coma  Patient does have a history of hepatitis C and did undergo complete treatment with complete resolution of his viral load  Patient is no longer following up with Gastroenterology  Diagnoses and all orders for this visit:    Preop examination  -     CBC and Platelet; Future  -     Basic metabolic panel; Future  -     Protime-INR; Future    Bladder mass    Cigarette nicotine dependence without complication          Subjective:      Patient ID: Carolynne Dancer is a 65 y o  male  Patient is a 78-year-old male with a history of anxiety disorder, hepatitis-C in the past status post complete treatment and resolution of his disease  Patient recently underwent an emergency appendectomy and when the patient had a CT scan of the abdomen at tumor was found in his bladder    Patient had verification that the tumor is carcinoma and will be going for trans urethral resection in the near future  Patient states he has been having no problems with chest pain or pressure and no increasing shortness of breath  He is still smoking cigarettes but smaller amounts than in the past and he was told the importance of quitting now  We did give the patient a slip for group some routine lab test to have performed prior to the procedure next week        The following portions of the patient's history were reviewed and updated as appropriate:   He  has a past medical history of History of transfusion (2013) and Infectious viral hepatitis  He   Patient Active Problem List    Diagnosis Date Noted    Status post appendectomy 08/15/2018    Bladder mass 08/15/2018    Generalized anxiety disorder 09/02/2016    Hepatitis C without hepatic coma 12/28/2015    Hypertension 11/06/2015    Atopic dermatitis 11/06/2015    Nicotine dependence 08/23/2012     He  has a past surgical history that includes Appendectomy; Ankle fracture surgery (2013); Shoulder arthroscopy (Bilateral); Tonsillectomy; and Colonoscopy  His family history is not on file  He  reports that he has been smoking  He has never used smokeless tobacco  He reports that he drinks alcohol  He reports that he does not use drugs  No current outpatient prescriptions on file  No current facility-administered medications for this visit  No current outpatient prescriptions on file prior to visit  No current facility-administered medications on file prior to visit  He is allergic to hydrocodone-acetaminophen       Review of Systems   Constitutional: Negative  HENT: Negative  Eyes: Negative  Respiratory: Negative  Cardiovascular: Negative  Gastrointestinal: Negative  Endocrine: Negative  Genitourinary: Negative  Musculoskeletal: Negative  Skin: Negative  Allergic/Immunologic: Negative      Hematological: Negative  Psychiatric/Behavioral: Negative for agitation, behavioral problems, confusion, decreased concentration, dysphoric mood, hallucinations, self-injury, sleep disturbance and suicidal ideas  The patient is nervous/anxious (Patient is notably anxious about the upcoming procedure)  The patient is not hyperactive  Objective:      /80 (BP Location: Left arm, Patient Position: Sitting, Cuff Size: Large)   Pulse 78   Temp 97 8 °F (36 6 °C)   Resp 14   Ht 5' 8" (1 727 m)   Wt 81 6 kg (180 lb)   SpO2 98%   BMI 27 37 kg/m²          Physical Exam   Constitutional: He is oriented to person, place, and time  He appears well-developed and well-nourished  No distress  Her pleasant 61-year-old male who is awake alert no acute distress and oriented x3   HENT:   Head: Normocephalic and atraumatic  Right Ear: External ear normal    Left Ear: External ear normal    Mouth/Throat: Oropharynx is clear and moist    Eyes: Conjunctivae and EOM are normal  Pupils are equal, round, and reactive to light  Right eye exhibits no discharge  Left eye exhibits no discharge  No scleral icterus  Neck: Normal range of motion  Neck supple  No JVD present  No tracheal deviation present  No thyromegaly present  Cardiovascular: Normal rate, regular rhythm, normal heart sounds and intact distal pulses  Exam reveals no gallop and no friction rub  No murmur heard  Pulmonary/Chest: Effort normal  No stridor  No respiratory distress  He has no wheezes  He has no rales  He exhibits no tenderness  Slightly decreased breath sounds anteriorly and posteriorly but no rales rhonchi or wheezes could be appreciated on exam today   Abdominal: Soft  Bowel sounds are normal  He exhibits no distension and no mass  There is no tenderness  There is no rebound and no guarding  Musculoskeletal: Normal range of motion  He exhibits no edema, tenderness or deformity  Lymphadenopathy:     He has no cervical adenopathy  Neurological: He is alert and oriented to person, place, and time  He has normal reflexes  He displays normal reflexes  No cranial nerve deficit  He exhibits normal muscle tone  Coordination normal    Skin: Skin is warm and dry  No rash noted  He is not diaphoretic  No erythema  No pallor  Psychiatric: His behavior is normal  Judgment and thought content normal    Mildly anxious mood   Nursing note and vitals reviewed

## 2018-09-04 NOTE — ASSESSMENT & PLAN NOTE
Patient has gone to the urologist for initial evaluation of hyper ladder mass that was seen when patient had an acute appendicitis recently  The decision was made to do a trans urethral resection for what was noted to be a carcinoma  Patient is here for preop evaluation  Patient states in general he is anxious understandably about the procedure to be performed and he is worried possibly about metastatic disease  He is reassured that they have found this particular cancer very early on and hopefully they will be able to resect completely  Patient will be going for some lab studies including his CBC, basic metabolic profile and a protime with an INR    Patient was evaluated in the office today and was felt to be medically clear for the procedure that will take place as long as labs are normal

## 2018-09-04 NOTE — ASSESSMENT & PLAN NOTE
We did discuss with the patient that 1 of the risk factors for him developing this particular carcinoma is his history of tobacco abuse for an extended period of time  He was told he needs to quit now prior to the surgery rather than to weight till afterwards

## 2018-09-04 NOTE — ASSESSMENT & PLAN NOTE
Patient does have a history of hepatitis C and did undergo complete treatment with complete resolution of his viral load  Patient is no longer following up with Gastroenterology

## 2018-09-08 ENCOUNTER — APPOINTMENT (OUTPATIENT)
Dept: LAB | Facility: MEDICAL CENTER | Age: 63
End: 2018-09-08
Payer: COMMERCIAL

## 2018-09-08 DIAGNOSIS — I10 ESSENTIAL HYPERTENSION: ICD-10-CM

## 2018-09-08 DIAGNOSIS — Z01.818 PREOP EXAMINATION: ICD-10-CM

## 2018-09-08 LAB
ANION GAP SERPL CALCULATED.3IONS-SCNC: 6 MMOL/L (ref 4–13)
BASOPHILS # BLD AUTO: 0.06 THOUSANDS/ΜL (ref 0–0.1)
BASOPHILS NFR BLD AUTO: 1 % (ref 0–1)
BUN SERPL-MCNC: 15 MG/DL (ref 5–25)
CALCIUM SERPL-MCNC: 9.2 MG/DL (ref 8.3–10.1)
CHLORIDE SERPL-SCNC: 106 MMOL/L (ref 100–108)
CO2 SERPL-SCNC: 26 MMOL/L (ref 21–32)
CREAT SERPL-MCNC: 0.88 MG/DL (ref 0.6–1.3)
EOSINOPHIL # BLD AUTO: 0.29 THOUSAND/ΜL (ref 0–0.61)
EOSINOPHIL NFR BLD AUTO: 4 % (ref 0–6)
ERYTHROCYTE [DISTWIDTH] IN BLOOD BY AUTOMATED COUNT: 12.9 % (ref 11.6–15.1)
GFR SERPL CREATININE-BSD FRML MDRD: 93 ML/MIN/1.73SQ M
GLUCOSE P FAST SERPL-MCNC: 124 MG/DL (ref 65–99)
HCT VFR BLD AUTO: 51.8 % (ref 36.5–49.3)
HGB BLD-MCNC: 17 G/DL (ref 12–17)
IMM GRANULOCYTES # BLD AUTO: 0.02 THOUSAND/UL (ref 0–0.2)
IMM GRANULOCYTES NFR BLD AUTO: 0 % (ref 0–2)
INR PPP: 1.03 (ref 0.86–1.17)
LYMPHOCYTES # BLD AUTO: 2.51 THOUSANDS/ΜL (ref 0.6–4.47)
LYMPHOCYTES NFR BLD AUTO: 32 % (ref 14–44)
MCH RBC QN AUTO: 30.6 PG (ref 26.8–34.3)
MCHC RBC AUTO-ENTMCNC: 32.8 G/DL (ref 31.4–37.4)
MCV RBC AUTO: 93 FL (ref 82–98)
MONOCYTES # BLD AUTO: 0.72 THOUSAND/ΜL (ref 0.17–1.22)
MONOCYTES NFR BLD AUTO: 9 % (ref 4–12)
NEUTROPHILS # BLD AUTO: 4.18 THOUSANDS/ΜL (ref 1.85–7.62)
NEUTS SEG NFR BLD AUTO: 54 % (ref 43–75)
NRBC BLD AUTO-RTO: 0 /100 WBCS
PLATELET # BLD AUTO: 184 THOUSANDS/UL (ref 149–390)
PMV BLD AUTO: 10.2 FL (ref 8.9–12.7)
POTASSIUM SERPL-SCNC: 4.8 MMOL/L (ref 3.5–5.3)
PROTHROMBIN TIME: 13.6 SECONDS (ref 11.8–14.2)
RBC # BLD AUTO: 5.55 MILLION/UL (ref 3.88–5.62)
SODIUM SERPL-SCNC: 138 MMOL/L (ref 136–145)
WBC # BLD AUTO: 7.78 THOUSAND/UL (ref 4.31–10.16)

## 2018-09-08 PROCEDURE — 85025 COMPLETE CBC W/AUTO DIFF WBC: CPT

## 2018-09-08 PROCEDURE — 85610 PROTHROMBIN TIME: CPT

## 2018-09-08 PROCEDURE — 80048 BASIC METABOLIC PNL TOTAL CA: CPT

## 2018-09-08 PROCEDURE — 36415 COLL VENOUS BLD VENIPUNCTURE: CPT

## 2018-09-12 ENCOUNTER — ANESTHESIA (OUTPATIENT)
Dept: PERIOP | Facility: AMBULARY SURGERY CENTER | Age: 63
End: 2018-09-12
Payer: COMMERCIAL

## 2018-09-12 ENCOUNTER — HOSPITAL ENCOUNTER (OUTPATIENT)
Facility: AMBULARY SURGERY CENTER | Age: 63
Setting detail: OUTPATIENT SURGERY
Discharge: HOME | End: 2018-09-12
Attending: UROLOGY | Admitting: UROLOGY
Payer: COMMERCIAL

## 2018-09-12 ENCOUNTER — APPOINTMENT (OUTPATIENT)
Dept: RADIOLOGY | Facility: AMBULARY SURGERY CENTER | Age: 63
End: 2018-09-12
Payer: COMMERCIAL

## 2018-09-12 VITALS
OXYGEN SATURATION: 96 % | DIASTOLIC BLOOD PRESSURE: 74 MMHG | BODY MASS INDEX: 26.52 KG/M2 | RESPIRATION RATE: 18 BRPM | TEMPERATURE: 97.4 F | HEART RATE: 58 BPM | WEIGHT: 175 LBS | SYSTOLIC BLOOD PRESSURE: 153 MMHG | HEIGHT: 68 IN

## 2018-09-12 DIAGNOSIS — N32.89 BLADDER MASS: ICD-10-CM

## 2018-09-12 PROCEDURE — C1769 GUIDE WIRE: HCPCS | Performed by: UROLOGY

## 2018-09-12 PROCEDURE — 88341 IMHCHEM/IMCYTCHM EA ADD ANTB: CPT | Performed by: PATHOLOGY

## 2018-09-12 PROCEDURE — 88342 IMHCHEM/IMCYTCHM 1ST ANTB: CPT | Performed by: PATHOLOGY

## 2018-09-12 PROCEDURE — 88307 TISSUE EXAM BY PATHOLOGIST: CPT | Performed by: PATHOLOGY

## 2018-09-12 PROCEDURE — 52204 CYSTOSCOPY W/BIOPSY(S): CPT | Performed by: UROLOGY

## 2018-09-12 RX ORDER — MAGNESIUM HYDROXIDE 1200 MG/15ML
LIQUID ORAL AS NEEDED
Status: DISCONTINUED | OUTPATIENT
Start: 2018-09-12 | End: 2018-09-12 | Stop reason: HOSPADM

## 2018-09-12 RX ORDER — SODIUM CHLORIDE 9 MG/ML
INJECTION, SOLUTION INTRAVENOUS CONTINUOUS PRN
Status: DISCONTINUED | OUTPATIENT
Start: 2018-09-12 | End: 2018-09-12 | Stop reason: SURG

## 2018-09-12 RX ORDER — LIDOCAINE HYDROCHLORIDE 10 MG/ML
INJECTION, SOLUTION INFILTRATION; PERINEURAL AS NEEDED
Status: DISCONTINUED | OUTPATIENT
Start: 2018-09-12 | End: 2018-09-12 | Stop reason: SURG

## 2018-09-12 RX ORDER — FENTANYL CITRATE 50 UG/ML
INJECTION, SOLUTION INTRAMUSCULAR; INTRAVENOUS AS NEEDED
Status: DISCONTINUED | OUTPATIENT
Start: 2018-09-12 | End: 2018-09-12 | Stop reason: SURG

## 2018-09-12 RX ORDER — FENTANYL CITRATE/PF 50 MCG/ML
25 SYRINGE (ML) INJECTION
Status: DISCONTINUED | OUTPATIENT
Start: 2018-09-12 | End: 2018-09-12 | Stop reason: HOSPADM

## 2018-09-12 RX ORDER — PROPOFOL 10 MG/ML
INJECTION, EMULSION INTRAVENOUS AS NEEDED
Status: DISCONTINUED | OUTPATIENT
Start: 2018-09-12 | End: 2018-09-12 | Stop reason: SURG

## 2018-09-12 RX ORDER — CIPROFLOXACIN 500 MG/1
500 TABLET, FILM COATED ORAL 2 TIMES DAILY
Qty: 6 TABLET | Refills: 0 | Status: SHIPPED | OUTPATIENT
Start: 2018-09-12 | End: 2018-09-15

## 2018-09-12 RX ORDER — SODIUM CHLORIDE, SODIUM LACTATE, POTASSIUM CHLORIDE, CALCIUM CHLORIDE 600; 310; 30; 20 MG/100ML; MG/100ML; MG/100ML; MG/100ML
125 INJECTION, SOLUTION INTRAVENOUS CONTINUOUS
Status: DISCONTINUED | OUTPATIENT
Start: 2018-09-12 | End: 2018-09-12 | Stop reason: HOSPADM

## 2018-09-12 RX ORDER — MIDAZOLAM HYDROCHLORIDE 1 MG/ML
INJECTION INTRAMUSCULAR; INTRAVENOUS AS NEEDED
Status: DISCONTINUED | OUTPATIENT
Start: 2018-09-12 | End: 2018-09-12 | Stop reason: SURG

## 2018-09-12 RX ORDER — ONDANSETRON 2 MG/ML
4 INJECTION INTRAMUSCULAR; INTRAVENOUS ONCE AS NEEDED
Status: DISCONTINUED | OUTPATIENT
Start: 2018-09-12 | End: 2018-09-12 | Stop reason: HOSPADM

## 2018-09-12 RX ORDER — IBUPROFEN 600 MG/1
600 TABLET ORAL EVERY 6 HOURS PRN
Qty: 10 TABLET | Refills: 0 | Status: SHIPPED | OUTPATIENT
Start: 2018-09-12 | End: 2020-06-03

## 2018-09-12 RX ORDER — ONDANSETRON 2 MG/ML
INJECTION INTRAMUSCULAR; INTRAVENOUS AS NEEDED
Status: DISCONTINUED | OUTPATIENT
Start: 2018-09-12 | End: 2018-09-12 | Stop reason: SURG

## 2018-09-12 RX ADMIN — LIDOCAINE HYDROCHLORIDE 50 MG: 10 INJECTION, SOLUTION INFILTRATION; PERINEURAL at 08:19

## 2018-09-12 RX ADMIN — FENTANYL CITRATE 50 MCG: 50 INJECTION, SOLUTION INTRAMUSCULAR; INTRAVENOUS at 08:25

## 2018-09-12 RX ADMIN — MIDAZOLAM HYDROCHLORIDE 2 MG: 1 INJECTION, SOLUTION INTRAMUSCULAR; INTRAVENOUS at 08:13

## 2018-09-12 RX ADMIN — SODIUM CHLORIDE: 0.9 INJECTION, SOLUTION INTRAVENOUS at 08:13

## 2018-09-12 RX ADMIN — ONDANSETRON 4 MG: 2 INJECTION INTRAMUSCULAR; INTRAVENOUS at 08:45

## 2018-09-12 RX ADMIN — FENTANYL CITRATE 25 MCG: 50 INJECTION INTRAMUSCULAR; INTRAVENOUS at 09:19

## 2018-09-12 RX ADMIN — PROPOFOL 200 MG: 10 INJECTION, EMULSION INTRAVENOUS at 08:19

## 2018-09-12 RX ADMIN — CEFAZOLIN SODIUM 1000 MG: 1 SOLUTION INTRAVENOUS at 08:19

## 2018-09-12 RX ADMIN — FENTANYL CITRATE 25 MCG: 50 INJECTION INTRAMUSCULAR; INTRAVENOUS at 09:24

## 2018-09-12 RX ADMIN — DEXAMETHASONE SODIUM PHOSPHATE 4 MG: 10 INJECTION INTRAMUSCULAR; INTRAVENOUS at 08:27

## 2018-09-12 RX ADMIN — FENTANYL CITRATE 50 MCG: 50 INJECTION, SOLUTION INTRAMUSCULAR; INTRAVENOUS at 08:26

## 2018-09-12 NOTE — H&P (VIEW-ONLY)
8/17/2018    Fernando Rather  1955  [id number]        Assessment  Urothelial carcinoma the bladder    Plan  We discussed the findings at length  We reviewed his CT scan findings as well as his cystoscopic findings  They understand that he has urothelial carcinoma of the bladder  Fortunately the rest of his CT scan appeared normal   We discussed the technique, risks, benefits for cystoscopy with trans urethral resection  I would also recommend left ureteral catheter placement intraoperatively in order to protect his left ureter during the resection  He understands that this will be removed, however he may require Toscano catheterization postoperatively depending on the degree of resection necessary  All questions answered to their satisfaction  Consent was obtained for cystoscopy, transurethral resection bladder tumor, left ureteral catheterization  History of Present Illness  Leigh Molina is a 65 y o  male referred for evaluation of bladder lesion  He developed severe acute abdominal pain on 8/9/2018  He went to [address] [place] [address] in CT scan was performed  This revealed acute appendicitis as well as a 2 cm enhancing nodular mass in the bladder  Patient has a greater than 40 pack year smoking history  He denies any gross hematuria or other urinary symptoms  He has had prostate screening in the past with his primary care physician  His last PSA in 2016 was 0 4  I reviewed his CT images on disc, which reveals 2 cm enhancing lesion in the bladder, left posterior wall  There is no obvious urothelial lesion anywhere else in the urinary tract  Cystoscopy  Date/Time: 8/17/2018 11:51 AM  Performed by: Renata Pappas  Authorized by: Renata Pappas     Additional Procedure Details: Flexible cystoscopy note     The patient was prepped and draped in sterile fashion  2% lidocaine jelly was instilled per urethra   The 16 Welsh flexible cystoscope was placed per urethra    No urethral or prostatic obstruction  Evaluation of the bladder reveals bilateral normal ureteral orifices  Adjacent to the left ureteral orifice, there is a greater than 2 cm papillary lesion along with multiple satellite lesions  The rest of the bladder was carefully evaluated and there were no other lesions noted   The scope was removed and the procedure was terminated  The patient tolerated the procedure well  Review of Systems  Review of Systems   Constitutional: Negative  HENT: Negative  Respiratory: Negative  Cardiovascular: Negative  Gastrointestinal: Negative  Genitourinary:        As per HPI   Musculoskeletal: Negative  Skin: Negative  Neurological: Negative  Hematological: Negative  Past Medical History  History reviewed  No pertinent past medical history  Past Social History  Past Surgical History:   Procedure Laterality Date    APPENDECTOMY         Past Family History  History reviewed  No pertinent family history  Past Social history  Social History     Social History    Marital status: /Civil Union     Spouse name: N/A    Number of children: N/A    Years of education: N/A     Occupational History    Not on file  Social History Main Topics    Smoking status: Current Every Day Smoker    Smokeless tobacco: Never Used    Alcohol use Yes      Comment: social    Drug use: No    Sexual activity: Not on file     Other Topics Concern    Not on file     Social History Narrative    No narrative on file     History   Smoking Status    Current Every Day Smoker   Smokeless Tobacco    Never Used       Current Medications  No current outpatient prescriptions on file  No current facility-administered medications for this visit  Allergies  Allergies   Allergen Reactions    Hydrocodone-Acetaminophen        Past Medical History, Social History, Family History, medications and allergies were reviewed      Vitals  Vitals:    08/17/18 1112   BP: 134/68 Pulse: 72   Weight: 80 2 kg (176 lb 12 8 oz)   Height: 5' 8" (1 727 m)       Physical Exam  Physical Exam   Constitutional: He is oriented to person, place, and time  He appears well-developed and well-nourished  Cardiovascular: Normal rate and regular rhythm  Pulmonary/Chest: Effort normal and breath sounds normal    Abdominal: Soft  Genitourinary: Penis normal    Musculoskeletal: Normal range of motion  Neurological: He is alert and oriented to person, place, and time  Skin: Skin is warm, dry and intact  Psychiatric: He has a normal mood and affect  Vitals reviewed          Results  No results found for: PSA  Lab Results   Component Value Date    CALCIUM 9 4 08/18/2016     08/18/2016    K 4 3 08/18/2016    CO2 26 08/18/2016     08/18/2016    BUN 16 08/18/2016    CREATININE 0 90 08/18/2016     Lab Results   Component Value Date    WBC 8 3 08/18/2016    HGB 16 1 08/18/2016    HCT 50 1 (H) 08/18/2016    MCV 94 9 08/18/2016     08/18/2016

## 2018-09-12 NOTE — OP NOTE
OPERATIVE REPORT  PATIENT NAME: Beba Chawla    :  1955  MRN: [id number]  Pt Location: [facility]    SURGERY DATE: 2018    Surgeon(s) and Role:     * Cathy Ladd MD - Primary    Preop Diagnosis:  Bladder mass [N32 89]    Post-Op Diagnosis Codes: * Bladder mass [N32 89]    Procedure(s) (LRB):  TRANSURETHRAL RESECTION OF BLADDER TUMOR (TURBT) (N/A)  CYSTOSCOPY WITH BIOPSIES (N/A), 5 cm tumor diameter  LEFT URETERAL CATHETERIZATION      Specimen(s):  ID Type Source Tests Collected by Time Destination   1 : Bladder Tumor Tissue Urinary Bladder TISSUE EXAM Cathy Ladd MD 2018 [id number]        Estimated Blood Loss:   Minimal    Drains:  Urethral Catheter Latex 18 Fr  (Active)   Number of days: 0       Anesthesia Type:   General    Operative Indications:  Bladder mass [N32 89]      Operative Findings:  Large tumor encompassing the left floor and portion of left lateral wall of the bladder, surrounding left ureteral orifice  Complications:   None    Procedure and Technique:  The patient was identified, brought to the operating room, and placed on the table in supine position  After induction of general anesthesia, the patient was placed in dorsal lithotomy position and prepped and draped in the usual sterile fashion  A complete formal timeout was performed  The 25 Armenian rigid cystoscope was placed per urethra and cystoscopy was performed  Large amount of tumor was noted encompassing the left floor and portion of the left lateral wall the bladder, surrounding the left ureteral orifice  The right ureteral orifice was identified with clear reflux  There was no additional tumor noted throughout the rest of the mucosa  I placed a 5 Armenian catheter into the left ureteral orifice up to a distance of 20 cm to allow for identification and protection of the ureter intraoperatively  The resectoscope was then placed after calibrating the urethra    Transurethral resection of the tumor was performed using the loop electrocautery  Care was taken to avoid injury to the ureters  All tumor was resected  Cautery was used for hemostasis  All chips were irrigated out with the [place] evacuators  At the conclusion an 25 Nauruan catheter was placed temporarily  The patient tolerated the procedure well and was transferred to the recovery room awake alert and in stable condition         I was present for the entire procedure    Patient Disposition:  PACU     SIGNATURE: Miguelangel Lopez MD  DATE: September 12, 2018  TIME: 9:00 AM

## 2018-09-12 NOTE — ANESTHESIA PREPROCEDURE EVALUATION
Review of Systems/Medical History  Patient summary reviewed  Chart reviewed  No history of anesthetic complications     Cardiovascular  Hypertension (no meds) ,    Pulmonary  Smoker , Tobacco cessation counseling given ,        GI/Hepatic    Hepatitis (treated and cured) C,        Genitourinary malignancy Bladder cancer,        Endo/Other  Negative endo/other ROS      GYN       Hematology  Negative hematology ROS      Musculoskeletal  Negative musculoskeletal ROS        Neurology  Negative neurology ROS      Psychology   Anxiety,            Physical Exam    Airway    Mallampati score: I  TM Distance: >3 FB  Neck ROM: full     Dental   No notable dental hx     Cardiovascular      Pulmonary      Other Findings       Lab Results   Component Value Date    WBC 7 78 09/08/2018    HGB 17 0 09/08/2018     09/08/2018     Lab Results   Component Value Date     09/08/2018    K 4 8 09/08/2018    BUN 15 09/08/2018    CREATININE 0 88 09/08/2018     Lab Results   Component Value Date    INR 1 03 09/08/2018     Anesthesia Plan  ASA Score- 2     Anesthesia Type- general with ASA Monitors  Additional Monitors:   Airway Plan: LMA  Plan Factors-    Induction- intravenous  Postoperative Plan-     Informed Consent- Anesthetic plan and risks discussed with patient and spouse  I personally reviewed this patient with the CRNA  Discussed and agreed on the Anesthesia Plan with the CRNA  Crissy Maguire

## 2018-09-12 NOTE — ANESTHESIA POSTPROCEDURE EVALUATION
Post-Op Assessment Note      CV Status:  Stable    Mental Status:  Awake and somnolent    Hydration Status:  Euvolemic    PONV Controlled:  Controlled    Airway Patency:  Patent    Post Op Vitals Reviewed: Yes          Staff: CRNA           /55 (09/12/18 0901)    Temp 97 6 °F (36 4 °C) (09/12/18 0901)    Pulse 55 (09/12/18 0901)   Resp 14 (09/12/18 0901)    SpO2 95 % (09/12/18 0901)

## 2018-09-12 NOTE — DISCHARGE INSTRUCTIONS
Bladder Cancer   AMBULATORY CARE:   Bladder cancer  starts in the cells that line your bladder  Common symptoms include the following:   · Blood in your urine or urine that is pink or orange    · A sudden need to urinate, or urinating more often than usual    · Trouble starting the stream of urine or urinating very little    · Pain or burning when you urinate    · Pain in your abdomen or pelvis     · Unexplained weight loss    · Feeling tired or weak  Call 911 for any of the following:   · You suddenly feel lightheaded and short of breath  · You cough up blood  Seek care immediately if:   · Your arm or leg feels warm, tender, and painful  It may look swollen and red  · You are unable to urinate  Contact your healthcare provider or oncologist if:   · You have a fever  · You vomit and cannot keep any liquids or food down  · You have new or worsening pain  · Your pain gets worse or does not go away after you take pain medicine  · You have questions or concerns about your condition or care  Treatment for bladder cancer  may include any of the following:  · Transurethral resection of bladder tumor (TURBT)  is a procedure used to remove the tumor  Bladder muscle near the tumor may also be removed  This procedure is done by inserting tools through your urethra and into your bladder  · Immunotherapy  is medicine given to help your immune system kill cancer cells  It is injected into your vein or directly into your bladder  · Chemotherapy  is medicine given to kill cancer cells  It may be given to you as a pill or an injection into your vein or muscle  It may also be injected directly into your bladder  · Radiation therapy  uses high-energy x-ray beams to kill cancer cells  · Surgery  may be needed to remove your bladder  Surrounding organs and lymph nodes may also be removed  Self-care:   · Do not smoke    Nicotine can damage blood vessels and make it hard to manage your bladder cancer  Smoking also increases your risk for new or returning cancer  Ask your healthcare provider for information if you currently smoke and need help to quit  E-cigarettes or smokeless tobacco still contain nicotine  Talk to your healthcare provider before you use these products  · Limit or do not drink alcohol  Alcohol may cause you to become dehydrated  Ask your oncologist if it is safe for you to drink alcohol, and how much is safe to drink  · Eat healthy foods  Healthy foods include fruit, vegetables, whole-grain breads, low-fat dairy products, beans, lean meats, and fish  Your healthcare provider may recommend that you eat less red meat  You need to eat enough calories to help prevent weight loss and increase your energy level  You also need protein to give you strength  If you do not feel hungry, eat small amounts often instead of large meals  · Drink liquids as directed  You may need to drink more liquids than usual to prevent dehydration  Ask how much liquid to drink each day and which liquids are best for you  · Exercise as directed  Exercise may help increase your energy level and appetite  Ask your healthcare provider how much exercise you need and which exercises are best for you  For more information and support: It may be difficult for you and your family to go through cancer and cancer treatments  Join a support group or talk with others who have gone through treatment  · [address] [place] [address]  Phone: [phone]  Web Address: http://[employer]/  [employer]  · [address] [place] [address]  Phone: 1- 734 - [phone]  Web Address: http://[employer]/  gov  Follow up with your healthcare provider or oncologist as directed: You will need to see your oncologist for ongoing treatment  Write down your questions so you remember to ask them during your visits    © 2017 [employer] [facility] is for End User's use only and may not be sold, redistributed or otherwise used for commercial purposes  All illustrations and images included in CareNotes® are the copyrighted property of [employer] M , Inc  or [employer]  The above information is an  only  It is not intended as medical advice for individual conditions or treatments  Talk to your doctor, nurse or pharmacist before following any medical regimen to see if it is safe and effective for you  Transurethral Resection of Bladder Tumors   WHAT YOU NEED TO KNOW:   Transurethral resection of bladder tumors (TURBT) is surgery to remove one or more tumors from your bladder  DISCHARGE INSTRUCTIONS:   Medicines:   · Medicines  help decrease pain or prevent vomiting  · Take your medicine as directed  Contact your healthcare provider if you think your medicine is not helping or if you have side effects  Tell him or her if you are allergic to any medicine  Keep a list of the medicines, vitamins, and herbs you take  Include the amounts, and when and why you take them  Bring the list or the pill bottles to follow-up visits  Carry your medicine list with you in case of an emergency  Follow up with your healthcare provider as directed:  Write down your questions so you remember to ask them during your visits  Care for your Toscano catheter:  Keep the bag below your waist  This will prevent urine from flowing back into your bladder and causing an infection or other problems  Also, keep the tube free of kinks so the urine will drain properly  Do not pull on the catheter  This can cause pain and bleeding and may cause the catheter to come out  Empty your urine drainage bag when it is ½ to ? full, or every 8 hours  If you have a smaller leg bag, empty it every 3 to 4 hours  Do the following when you empty your urine drainage bag:  · Hold the urine bag over the toilet or a large container       · Remove the drain spout from its sleeve at the bottom of the urine bag  Do not touch the tip of the drain spout  Open the slide valve on the spout  · Let the urine flow out of the urine bag into the toilet or container  Do not let the drainage tube touch anything  · Clean the end of the drain spout with alcohol when the bag is empty  Ask which cleaning solution is best to use  · Close the slide valve and put the drain spout into its sleeve at the bottom of the urine bag  Write down how much urine was in your bag if you were asked to keep a record  Contact your healthcare provider if:   · You have a fever or chills  · You have new or more blood in your urine  · You have nausea or are vomiting  · You have new or more pain when you urinate  · You are unable to control when you urinate  · You have questions or concerns about your condition or care  Seek care immediately or call 911 if:   · You have heavy bleeding from your urethra  · You start to urinate less often, very little, or not at all  · You have severe pain in your abdomen or pelvis  © 2017 [facility] Information is for End User's use only and may not be sold, redistributed or otherwise used for commercial purposes  All illustrations and images included in CareNotes® are the copyrighted property of [employer]  or [employer]ña Johansen  The above information is an  only  It is not intended as medical advice for individual conditions or treatments  Talk to your doctor, nurse or pharmacist before following any medical regimen to see if it is safe and effective for you

## 2018-09-26 ENCOUNTER — OFFICE VISIT (OUTPATIENT)
Dept: UROLOGY | Facility: AMBULATORY SURGERY CENTER | Age: 63
End: 2018-09-26
Payer: COMMERCIAL

## 2018-09-26 VITALS
HEIGHT: 68 IN | DIASTOLIC BLOOD PRESSURE: 80 MMHG | SYSTOLIC BLOOD PRESSURE: 138 MMHG | WEIGHT: 178 LBS | BODY MASS INDEX: 26.98 KG/M2 | HEART RATE: 69 BPM

## 2018-09-26 DIAGNOSIS — C67.9 UROTHELIAL CARCINOMA OF BLADDER (HCC): Primary | ICD-10-CM

## 2018-09-26 PROCEDURE — 99214 OFFICE O/P EST MOD 30 MIN: CPT | Performed by: UROLOGY
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
## 2018-10-10 ENCOUNTER — TELEPHONE (OUTPATIENT)
Dept: UROLOGY | Facility: CLINIC | Age: 63
End: 2018-10-10

## 2018-10-10 NOTE — TELEPHONE ENCOUNTER
Received a call from the pt's wife asking what his pre-op office visit was for and asking that I call the pt on his cell phone to explain  Left a detailed msg for the pt explaining he does not need a clearance appt with any other physicians prior to surgery; but does require an H&P appt in our office with our [address] [place] [address] within 30 days of his scheduled surgery per hospital protocol  Asked that the pt call back with any questions he may still have

## 2018-10-29 ENCOUNTER — OFFICE VISIT (OUTPATIENT)
Dept: UROLOGY | Facility: CLINIC | Age: 63
End: 2018-10-29
Payer: COMMERCIAL

## 2018-10-29 VITALS
HEIGHT: 68 IN | HEART RATE: 68 BPM | DIASTOLIC BLOOD PRESSURE: 82 MMHG | WEIGHT: 180.6 LBS | SYSTOLIC BLOOD PRESSURE: 118 MMHG | BODY MASS INDEX: 27.37 KG/M2

## 2018-10-29 DIAGNOSIS — R35.1 NOCTURIA: Primary | ICD-10-CM

## 2018-10-29 LAB
SL AMB  POCT GLUCOSE, UA: NORMAL
SL AMB LEUKOCYTE ESTERASE,UA: NORMAL
SL AMB POCT BLOOD,UA: NORMAL
SL AMB POCT CLARITY,UA: CLEAR
SL AMB POCT COLOR,UA: YELLOW
SL AMB POCT KETONES,UA: NORMAL
SL AMB POCT NITRITE,UA: NORMAL
SL AMB POCT PH,UA: 6
SL AMB POCT SPECIFIC GRAVITY,UA: 1.01
SL AMB POCT URINE PROTEIN: NORMAL

## 2018-10-29 PROCEDURE — 81002 URINALYSIS NONAUTO W/O SCOPE: CPT | Performed by: PHYSICIAN ASSISTANT

## 2018-10-29 PROCEDURE — 99213 OFFICE O/P EST LOW 20 MIN: CPT | Performed by: PHYSICIAN ASSISTANT
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
## 2018-11-01 ENCOUNTER — ANESTHESIA EVENT (OUTPATIENT)
Dept: PERIOP | Facility: AMBULARY SURGERY CENTER | Age: 63
End: 2018-11-01
Payer: COMMERCIAL

## 2018-11-07 NOTE — PRE-PROCEDURE INSTRUCTIONS
Pre-Surgery Instructions:   Medication Instructions    ibuprofen (MOTRIN) 600 mg tablet Instructed patient per Anesthesia Guidelines      Pre op and showering instructions using an antibacterial soap reviewed

## 2018-11-14 ENCOUNTER — HOSPITAL ENCOUNTER (OUTPATIENT)
Facility: AMBULARY SURGERY CENTER | Age: 63
Setting detail: OUTPATIENT SURGERY
Discharge: HOME | End: 2018-11-14
Attending: UROLOGY | Admitting: UROLOGY
Payer: COMMERCIAL

## 2018-11-14 ENCOUNTER — TELEPHONE (OUTPATIENT)
Dept: UROLOGY | Facility: CLINIC | Age: 63
End: 2018-11-14

## 2018-11-14 ENCOUNTER — ANESTHESIA (OUTPATIENT)
Dept: PERIOP | Facility: AMBULARY SURGERY CENTER | Age: 63
End: 2018-11-14
Payer: COMMERCIAL

## 2018-11-14 VITALS
HEIGHT: 68 IN | OXYGEN SATURATION: 97 % | BODY MASS INDEX: 27.45 KG/M2 | TEMPERATURE: 97.9 F | HEART RATE: 63 BPM | WEIGHT: 181.13 LBS | DIASTOLIC BLOOD PRESSURE: 69 MMHG | RESPIRATION RATE: 18 BRPM | SYSTOLIC BLOOD PRESSURE: 138 MMHG

## 2018-11-14 DIAGNOSIS — C67.9 UROTHELIAL CARCINOMA OF BLADDER (HCC): ICD-10-CM

## 2018-11-14 PROCEDURE — 52204 CYSTOSCOPY W/BIOPSY(S): CPT | Performed by: UROLOGY

## 2018-11-14 PROCEDURE — 52332 CYSTOSCOPY AND TREATMENT: CPT | Performed by: UROLOGY

## 2018-11-14 PROCEDURE — 88342 IMHCHEM/IMCYTCHM 1ST ANTB: CPT | Performed by: PATHOLOGY

## 2018-11-14 PROCEDURE — C2617 STENT, NON-COR, TEM W/O DEL: HCPCS | Performed by: UROLOGY

## 2018-11-14 PROCEDURE — 88305 TISSUE EXAM BY PATHOLOGIST: CPT | Performed by: PATHOLOGY

## 2018-11-14 DEVICE — STENT URETERAL 6 FR 26CM INLAY OPTIMA: Type: IMPLANTABLE DEVICE | Site: URETER | Status: FUNCTIONAL

## 2018-11-14 RX ORDER — ONDANSETRON 2 MG/ML
INJECTION INTRAMUSCULAR; INTRAVENOUS AS NEEDED
Status: DISCONTINUED | OUTPATIENT
Start: 2018-11-14 | End: 2018-11-14 | Stop reason: SURG

## 2018-11-14 RX ORDER — MAGNESIUM HYDROXIDE 1200 MG/15ML
LIQUID ORAL AS NEEDED
Status: DISCONTINUED | OUTPATIENT
Start: 2018-11-14 | End: 2018-11-14 | Stop reason: HOSPADM

## 2018-11-14 RX ORDER — GLYCOPYRROLATE 0.2 MG/ML
INJECTION INTRAMUSCULAR; INTRAVENOUS AS NEEDED
Status: DISCONTINUED | OUTPATIENT
Start: 2018-11-14 | End: 2018-11-14 | Stop reason: SURG

## 2018-11-14 RX ORDER — PROPOFOL 10 MG/ML
INJECTION, EMULSION INTRAVENOUS AS NEEDED
Status: DISCONTINUED | OUTPATIENT
Start: 2018-11-14 | End: 2018-11-14 | Stop reason: SURG

## 2018-11-14 RX ORDER — SODIUM CHLORIDE 9 MG/ML
125 INJECTION, SOLUTION INTRAVENOUS CONTINUOUS
Status: DISCONTINUED | OUTPATIENT
Start: 2018-11-14 | End: 2018-11-14 | Stop reason: HOSPADM

## 2018-11-14 RX ORDER — MIDAZOLAM HYDROCHLORIDE 1 MG/ML
INJECTION INTRAMUSCULAR; INTRAVENOUS AS NEEDED
Status: DISCONTINUED | OUTPATIENT
Start: 2018-11-14 | End: 2018-11-14 | Stop reason: SURG

## 2018-11-14 RX ORDER — IBUPROFEN 600 MG/1
600 TABLET ORAL EVERY 6 HOURS PRN
Qty: 20 TABLET | Refills: 0 | Status: SHIPPED | OUTPATIENT
Start: 2018-11-14 | End: 2018-11-20 | Stop reason: SDUPTHER

## 2018-11-14 RX ORDER — ONDANSETRON 2 MG/ML
4 INJECTION INTRAMUSCULAR; INTRAVENOUS ONCE AS NEEDED
Status: DISCONTINUED | OUTPATIENT
Start: 2018-11-14 | End: 2018-11-14 | Stop reason: HOSPADM

## 2018-11-14 RX ORDER — SODIUM CHLORIDE 9 MG/ML
INJECTION, SOLUTION INTRAVENOUS CONTINUOUS PRN
Status: DISCONTINUED | OUTPATIENT
Start: 2018-11-14 | End: 2018-11-14 | Stop reason: SURG

## 2018-11-14 RX ORDER — ONDANSETRON 2 MG/ML
INJECTION INTRAMUSCULAR; INTRAVENOUS AS NEEDED
Status: DISCONTINUED | OUTPATIENT
Start: 2018-11-14 | End: 2018-11-14

## 2018-11-14 RX ORDER — CEPHALEXIN 500 MG/1
500 CAPSULE ORAL 3 TIMES DAILY
Qty: 15 CAPSULE | Refills: 0 | Status: SHIPPED | OUTPATIENT
Start: 2018-11-14 | End: 2018-11-19

## 2018-11-14 RX ORDER — FENTANYL CITRATE 50 UG/ML
INJECTION, SOLUTION INTRAMUSCULAR; INTRAVENOUS AS NEEDED
Status: DISCONTINUED | OUTPATIENT
Start: 2018-11-14 | End: 2018-11-14 | Stop reason: SURG

## 2018-11-14 RX ORDER — FENTANYL CITRATE/PF 50 MCG/ML
25 SYRINGE (ML) INJECTION
Status: DISCONTINUED | OUTPATIENT
Start: 2018-11-14 | End: 2018-11-14 | Stop reason: HOSPADM

## 2018-11-14 RX ORDER — LIDOCAINE HYDROCHLORIDE 10 MG/ML
INJECTION, SOLUTION INFILTRATION; PERINEURAL AS NEEDED
Status: DISCONTINUED | OUTPATIENT
Start: 2018-11-14 | End: 2018-11-14 | Stop reason: SURG

## 2018-11-14 RX ADMIN — GLYCOPYRROLATE 0.2 MG: 0.2 INJECTION, SOLUTION INTRAMUSCULAR; INTRAVENOUS at 11:36

## 2018-11-14 RX ADMIN — MIDAZOLAM HYDROCHLORIDE 2 MG: 1 INJECTION, SOLUTION INTRAMUSCULAR; INTRAVENOUS at 11:04

## 2018-11-14 RX ADMIN — DEXAMETHASONE SODIUM PHOSPHATE 4 MG: 10 INJECTION INTRAMUSCULAR; INTRAVENOUS at 11:18

## 2018-11-14 RX ADMIN — PROPOFOL 200 MG: 10 INJECTION, EMULSION INTRAVENOUS at 11:09

## 2018-11-14 RX ADMIN — ONDANSETRON 4 MG: 2 INJECTION INTRAMUSCULAR; INTRAVENOUS at 11:40

## 2018-11-14 RX ADMIN — FENTANYL CITRATE 25 MCG: 50 INJECTION, SOLUTION INTRAMUSCULAR; INTRAVENOUS at 11:21

## 2018-11-14 RX ADMIN — CEFAZOLIN SODIUM 2000 MG: 1 SOLUTION INTRAVENOUS at 11:06

## 2018-11-14 RX ADMIN — LIDOCAINE HYDROCHLORIDE 50 MG: 10 INJECTION, SOLUTION INFILTRATION; PERINEURAL at 11:09

## 2018-11-14 RX ADMIN — FENTANYL CITRATE 50 MCG: 50 INJECTION, SOLUTION INTRAMUSCULAR; INTRAVENOUS at 11:09

## 2018-11-14 RX ADMIN — FENTANYL CITRATE 25 MCG: 50 INJECTION, SOLUTION INTRAMUSCULAR; INTRAVENOUS at 11:16

## 2018-11-14 RX ADMIN — SODIUM CHLORIDE: 0.9 INJECTION, SOLUTION INTRAVENOUS at 11:06

## 2018-11-14 NOTE — ANESTHESIA POSTPROCEDURE EVALUATION
Post-Op Assessment Note      CV Status:  Stable    Mental Status:  Alert and awake    Hydration Status:  Euvolemic    PONV Controlled:  Controlled    Airway Patency:  Patent    Post Op Vitals Reviewed: Yes          Staff: CRNA           BP   130/68   Temp  97 5   Pulse  68   Resp   18   SpO2   96

## 2018-11-14 NOTE — ANESTHESIA PREPROCEDURE EVALUATION
Review of Systems/Medical History    Chart reviewed  No history of anesthetic complications     Cardiovascular  Exercise tolerance (METS): >4,  Hypertension ,    Pulmonary  Smoker ,        GI/Hepatic    Hepatitis C,   Comment: Hep C cured       Comment: Bladder CA     Endo/Other     GYN       Hematology   Musculoskeletal       Neurology  Negative neurology ROS      Psychology           Physical Exam    Airway    Mallampati score: II  TM Distance: >3 FB  Neck ROM: full     Dental   No notable dental hx     Cardiovascular      Pulmonary      Other Findings        Anesthesia Plan  ASA Score- 2     Anesthesia Type- general with ASA Monitors  Additional Monitors:   Airway Plan: LMA  Plan Factors-    Induction- intravenous  Postoperative Plan-     Informed Consent- Anesthetic plan and risks discussed with patient  I personally reviewed this patient with the CRNA  Discussed and agreed on the Anesthesia Plan with the CRNA  Mega Snowden

## 2018-11-14 NOTE — TELEPHONE ENCOUNTER
Patient managed by Dr Skyla Boss in the [place] office  Patient had TRANSURETHRAL RESECTION OF BLADDER TUMOR (TURBT), BLADDER BIOPSY, CYSTOSCOPY; INSERTION OF LEFT  URETERAL STENT done today with Dr Skyla Boss in the OR  Per Dr Skyla Boss stent should be removed next Tuesday, 11/20/18  Patient scheduled for nurse visit on 11/20/18 at 8:00 in the [place] office  Instructed patient on common stent symptoms  Patient knows to hydrate well with water and call office with any questions  Patient should follow up as already scheduled with Dr Skyla Boss for his post op discussion

## 2018-11-14 NOTE — INTERVAL H&P NOTE
H&P reviewed  After examining the patient I find no changes in the patients condition since the H&P had been written  Will proceed with cystoscopy, bladder biopsy as planned
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
## 2018-11-14 NOTE — DISCHARGE INSTRUCTIONS
Cystoscopy   AMBULATORY CARE:   A cystoscopy  is a procedure to look inside of your urethra and bladder using a cystoscope  A cystoscope is a small tube with a light and magnifying camera on the end  The procedure is used to diagnose and treat conditions of the bladder, urethra, and prostate  The procedure is also done to remove stones or blood clots from the urethra or bladder  Your healthcare provider may do other tests, such as ureteroscopy, during a cystoscopy  Prepare for your cystoscopy: You may need to stop smoking several days before your procedure, if you are having general anesthesia  Tell your healthcare provider what medicines you take  Your healthcare provider will tell you what medicines to take and not to take on the day of your procedure  You may need to stop taking medicines such as anticoagulants, aspirin, and ibuprofen several days before your procedure  He may tell you stop eating after midnight the night before your procedure  You may be asked to drink a large amount of liquids before your procedure  Make plans for someone to drive you home after your procedure  During your cystoscopy:   · You may be given general anesthesia to keep you asleep and pain free during your procedure  Your healthcare provider may give you anesthesia in your spine  With spinal anesthesia the lower part of your body will be numb  You will not feel pain during your procedure  Your healthcare provider may instead use local anesthesia that is put into your urethra and bladder  You will not feel pain, but you may be able to feel some pressure during your procedure  With local anesthesia, you may feel burning or need to urinate when the cystoscope is put in and removed  · You will be placed on your back and your feet may be placed in stirrups  The cystoscope will be will be placed through your urethra and into your bladder   The urologist will look at the walls of your urethra as the scope goes through to your bladder  Your bladder may be filled with an irrigation liquid to help your urologist see inside of your bladder more clearly   Special tools may used to remove tissue or stones  Your urologist may use a special tool to stop bleeding in your bladder  If there are blood clots in your bladder, your healthcare provider will inject an irrigation fluid into your bladder  Then he will use suction to remove the fluid and blood clots  After a cystoscopy:  After you are fully awake, you will go home  After your cystoscopy, it is normal to have pink-colored urine  It is also normal to have an increased need to urinate  You may have burning when you urinate  If you had general anesthesia, it may take at least 24 hours before you feel like your usual self  Risks of a cystoscopy: You may bleed more than expected or develop an infection  Swelling caused by the cystoscopy may cause a blockage or slow urine flow  Seek care immediately if:   · Your urine turns from pink to red, or you have clots in your urine  · You cannot urinate and your bladder feels full  · Your pain or burning becomes worse or lasts longer than 2 days  Contact your healthcare provider or urologist if:   · Your urine stays pink for longer than 3 days  · Your pain or burning becomes worse  · Your skin is itchy, swollen, or has a new rash  · You have a fever and chills  · You have questions or concerns about your condition or care  After your cystoscopy: It is normal to have pink-colored urine  It is also normal to have an increased need to urinate and burning when you urinate  If you had general anesthesia, it may take at least 24 hours before you feel like your usual self  · Drink at least 3 to 4 glasses of water daily for 2 days after your procedure  Avoid acidic juices such as orange juice and lemonade  Water can help prevent blood clots from forming   It can also help decrease the amount of acid in your urine that can cause burning  · Sit in a warm tub of water  Warm baths relieve pain and bladder spasms  · Do not have sex  until your healthcare provider tells you it is okay  Sex may increase your risk for a urinary tract infection  Medicines: You may  be given any of the following:  · Antibiotics  help treat or prevent a bacterial infection  · Acetaminophen  decreases pain and fever  It is available without a doctor's order  Ask how much to take and how often to take it  Follow directions  Read the labels of all other medicines you are using to see if they also contain acetaminophen, or ask your doctor or pharmacist  Acetaminophen can cause liver damage if not taken correctly  Do not use more than 4 grams (4,000 milligrams) total of acetaminophen in one day  · Take your medicine as directed  Contact your healthcare provider if you think your medicine is not helping or if you have side effects  Tell him or her if you are allergic to any medicine  Keep a list of the medicines, vitamins, and herbs you take  Include the amounts, and when and why you take them  Bring the list or the pill bottles to follow-up visits  Carry your medicine list with you in case of an emergency  Follow up with your healthcare provider as directed: You may need to have another cystoscopy  Write down your questions so you remember to ask them during your visits  © 2017 [address] Information is for End User's use only and may not be sold, redistributed or otherwise used for commercial purposes  All illustrations and images included in CareNotes® are the copyrighted property of [employer]  or [employer]  The above information is an  only  It is not intended as medical advice for individual conditions or treatments  Talk to your doctor, nurse or pharmacist before following any medical regimen to see if it is safe and effective for you        Ureteral Stent Placement   WHAT YOU NEED TO KNOW: Ureteral stent placement is a procedure to open a blocked or narrow ureter  The ureter is the tube that carries urine from your kidney into your bladder  A stent is a thin hollow plastic tube used to hold your ureter open and allow urine to flow  The stent may stay in for several weeks  DISCHARGE INSTRUCTIONS:   Medicines:   · Pain medicine  may be given to take away or decrease pain  Do not wait until the pain is severe before you take your medicine  · Antibiotics  help prevent infections  Your healthcare provider may prescribe these for you while your stent remains in  · Take your medicine as directed  Contact your healthcare provider if you think your medicine is not helping or if you have side effects  Tell him or her if you are allergic to any medicine  Keep a list of the medicines, vitamins, and herbs you take  Include the amounts, and when and why you take them  Bring the list or the pill bottles to follow-up visits  Carry your medicine list with you in case of an emergency  Follow up with your urologist as directed: You will need regular follow-up visits with your urologist as long as the stent remains in  He will check to make sure the stent is working properly  He may do urine cultures to check for infection  Write down your questions so you remember to ask them during your visits  Self-care:   · Drink liquids  as directed  Ask your healthcare provider how much liquid to drink each day and which liquids are best for you  Fluids such as cranberry or apple juice may be especially helpful to prevent urinary infections  · Return to normal activities  the day after your stent placement or as directed by your healthcare provider  · You may take a shower  the day after your stent placement if your healthcare provider says it is okay  Contact your healthcare provider or urologist if:   · You have a fever or chills  · You feel like you need to urinate often      · You have pain when you urinate or pain around your bladder or kidney  · You see blood in your urine or it looks cloudy  · You have questions or concerns about your condition or care  Seek care immediately or call 911 if:   · You urinate little or not at all  · You have severe pain in your abdomen  © 2017 [address] Information is for End User's use only and may not be sold, redistributed or otherwise used for commercial purposes  All illustrations and images included in CareNotes® are the copyrighted property of [employer]  or [employer]  The above information is an  only  It is not intended as medical advice for individual conditions or treatments  Talk to your doctor, nurse or pharmacist before following any medical regimen to see if it is safe and effective for you

## 2018-11-14 NOTE — OP NOTE
OPERATIVE REPORT  PATIENT NAME: Bentley Valenzuela    :  1955  MRN: [id number]  Pt Location: [facility]    SURGERY DATE: 2018    Surgeon(s) and Role:     Ursula Ortiz MD - Primary    Preop Diagnosis:  Urothelial carcinoma of bladder ([facility] [address] ) [C67 9]    Post-Op Diagnosis Codes:     * Urothelial carcinoma of bladder ([facility] [address] ) [C67 9]    Procedure(s) (LRB):  TRANSURETHRAL RESECTION OF BLADDER TUMOR (TURBT), BLADDER BIOPSY, CYSTOSCOPY (N/A)   LEFT URETERAL STENT PLACEMENT      Specimen(s):  ID Type Source Tests Collected by Time Destination   1 : Left joel-ureteral tissue Tissue Urinary Bladder TISSUE EXAM Kasey Montano MD 2018 1114    2 : Re-biopsy left bladder tumor Tissue Urinary Bladder TISSUE EXAM Kasey Montano MD 2018 1130        Estimated Blood Loss:   Minimal    Drains:  Ureteral Drain/Stent Left ureter 6 Fr  (Active)   Number of days: 0       Anesthesia Type:   General    Operative Indications:  Urothelial carcinoma of bladder ([facility] [address] ) [C67 9]      Operative Findings:  Inflammation/possible residual tumor at the left ureteral orifice  Additional abnormal area posterior  Complications:   None    Procedure and Technique:  Patient identified, brought to the operating, placed on table in supine position  After induction of general anesthesia he was placed in dorsal lithotomy position and prepped and draped in usual sterile fashion  A formal time-out was performed  The 25 Bulgarian rigid cystoscope was introduced per urethra into the bladder  No urethral or prostatic abnormality noted  There was abnormal tissue noted at the left ureteral orifice, possibly inflammation from prior biopsy or alternatively malignancy  Was also inflamed tissue surrounding the orifice and posterior to it  Cold cup biopsies were then utilized to biopsy both ureteral orifice itself and the surrounding tissue  Two separate specimens were sent  Bugbee cautery was used for hemostasis    The orifice was clearly patent, however I opted to place a ureteral stent due to the cautery at the level of the orifice itself  A guidewire was passed and followed by a 26 cm x 6 Ukrainian double-J stent with string  The patient tolerated this procedure well     I was present for the entire procedure    Patient Disposition:  PACU     SIGNATURE: Shanthi Schroeder MD  DATE: November 14, 2018  TIME: 11:51 AM

## 2018-11-20 ENCOUNTER — PROCEDURE VISIT (OUTPATIENT)
Dept: UROLOGY | Facility: AMBULATORY SURGERY CENTER | Age: 63
End: 2018-11-20
Payer: COMMERCIAL

## 2018-11-20 VITALS
WEIGHT: 177 LBS | DIASTOLIC BLOOD PRESSURE: 80 MMHG | HEART RATE: 88 BPM | HEIGHT: 68 IN | SYSTOLIC BLOOD PRESSURE: 120 MMHG | BODY MASS INDEX: 26.83 KG/M2

## 2018-11-20 DIAGNOSIS — N32.89 BLADDER MASS: ICD-10-CM

## 2018-11-20 PROCEDURE — 99211 OFF/OP EST MAY X REQ PHY/QHP: CPT | Performed by: UROLOGY

## 2018-11-20 NOTE — LETTER
November 20, 2018     Patient: Dorie Leroy   YOB: 1955   Date of Visit: 11/20/2018       To Whom it May Concern:    Dorie Leroy is under my professional care  He was seen in my office on 11/20/2018  He may return to work on 11/21/2018  If you have any questions or concerns, please don't hesitate to call           Sincerely,        Dr Flor Granda      CC: Dorie Abrahammarcello

## 2018-11-20 NOTE — PROGRESS NOTES
Vikki Hernandez  [id number]  1955 11/20/2018      Diagnosis  Chief Complaint     Post-op; Bladder Cancer        Patient is s/p cystoscopy, TURBT, left stent placement on 11/14/2018 with Dr Isaías Kovacs  Patient will follow up as scheduled for path review with Dr Vicky Prince   He was provided with a work note excusing him today if he doesn't feel up to returning to work after stent removal   Gaston Chen bladder cancer basics book for the newly diagnosed patient was provided today  Procedure Stent with String Removal    Vitals:    11/20/18 0758   BP: 120/80   Pulse: 88   Weight: 80 3 kg (177 lb)   Height: 5' 8" (1 727 m)   Reviewed urological anatomy with patient and wife  Use a picture to show patient where ureteral orfice is located and in general where the stent is located  Reviewed that biopsy according to op note was at the left ureteral orfice so stent was placed just to ensure that opening remained patent  Patient and wife verbalized understanding and their questions were answered to their satisfaction  Stent with string removed without difficulty  Reviewed post stent removal symptoms including flank pain, nausea, dysuria, and hematuria  Instructed to increase PO fluids  May take NSAIDS and other prescribed pain medication as needed  Encouraged to call vs report to the ER with uncontrolled pain, fever, chills, nausea and vomiting  Reviewed that hematuria can be normal on and off after bladder biopsy and urinary bleeding likely exacerbated by ibuprofen use for stent discomfort  Encouraged avoidance of constipation and hydration with water          JAX Driscoll BSN

## 2018-11-28 ENCOUNTER — TELEPHONE (OUTPATIENT)
Dept: UROLOGY | Facility: AMBULATORY SURGERY CENTER | Age: 63
End: 2018-11-28

## 2018-11-28 ENCOUNTER — OFFICE VISIT (OUTPATIENT)
Dept: UROLOGY | Facility: AMBULATORY SURGERY CENTER | Age: 63
End: 2018-11-28
Payer: COMMERCIAL

## 2018-11-28 VITALS
SYSTOLIC BLOOD PRESSURE: 144 MMHG | HEART RATE: 70 BPM | BODY MASS INDEX: 26.83 KG/M2 | DIASTOLIC BLOOD PRESSURE: 88 MMHG | HEIGHT: 68 IN | WEIGHT: 177 LBS

## 2018-11-28 DIAGNOSIS — C67.9 UROTHELIAL CARCINOMA OF BLADDER (HCC): Primary | ICD-10-CM

## 2018-11-28 PROCEDURE — 99214 OFFICE O/P EST MOD 30 MIN: CPT | Performed by: UROLOGY
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
## 2018-11-29 ENCOUNTER — TELEPHONE (OUTPATIENT)
Dept: UROLOGY | Facility: AMBULATORY SURGERY CENTER | Age: 63
End: 2018-11-29

## 2018-11-29 NOTE — TELEPHONE ENCOUNTER
New start BCG x 6 doses  Last bladder biopsy 11/14, would be due to start 6 weeks thereafter which is the week of Christina  Patient reports he may also have an insurance change the first of the year, therefore will likely start BCG in January  He will need cystoscopy in the OR after BCG therefore will be scheduled for an office with Dr Cristy Whitley 1-2 weeks after finishing BCG to plan for OR at that time  Called and left patient a message to call back to discuss treatment and scheduling

## 2018-11-30 NOTE — TELEPHONE ENCOUNTER
Patient returned the call yesterday and prefers to be called back after 3 pm  Will try again today as time allows

## 2018-11-30 NOTE — TELEPHONE ENCOUNTER
Called and spoke with patient  Tentatively scheduled starting 1/8/19, will need to recheck insurance after the first of the year  Will call patient back next week after 3pm to review the treatment in depth

## 2018-12-05 NOTE — TELEPHONE ENCOUNTER
Called and spoke with patient  Reviewed BCG indication, side effects, and aftercare in depth with patient  Also reviewed bladder cancer in general  Patient's questions were answered to his satisfaction  He will call back after the first of the year with his new insurance information

## 2018-12-18 NOTE — TELEPHONE ENCOUNTER
Pt asking to speak with you he would like a call back regarding upcoming BCG treatments   [phone]

## 2018-12-19 NOTE — TELEPHONE ENCOUNTER
Called and spoke with patient at length  Answered all of his additional questions to his satisfaction  He was concerned about the cystoscopy in the OR after treatment  Reviewed that this was just for surveillance purposes to ensure there were no new tumors or areas of concerns, it appears the ureteroscopy will also be planned at that time again as a precaution given the previous tumor was close to the ureteral orifice  This was evaluated previously with CT scan of upper tract which showed no abnormalities  Patient verbalized understanding

## 2019-01-02 NOTE — TELEPHONE ENCOUNTER
Called and left a message for patient today requesting his new insurance numbers and information so we can check for coverage of BCG

## 2019-01-07 NOTE — TELEPHONE ENCOUNTER
BCG office stock ok to use no auth needed spoke with Edwin Hinkle at [facility] ref# [id number]  Thanks  Kassie Aranda

## 2019-01-08 ENCOUNTER — PROCEDURE VISIT (OUTPATIENT)
Dept: UROLOGY | Facility: AMBULATORY SURGERY CENTER | Age: 64
End: 2019-01-08
Payer: COMMERCIAL

## 2019-01-08 VITALS
DIASTOLIC BLOOD PRESSURE: 88 MMHG | HEART RATE: 80 BPM | HEIGHT: 68 IN | SYSTOLIC BLOOD PRESSURE: 158 MMHG | BODY MASS INDEX: 27.4 KG/M2 | WEIGHT: 180.8 LBS

## 2019-01-08 DIAGNOSIS — C67.9 MALIGNANT NEOPLASM OF URINARY BLADDER, UNSPECIFIED SITE (HCC): Primary | ICD-10-CM

## 2019-01-08 LAB
SL AMB  POCT GLUCOSE, UA: NORMAL
SL AMB LEUKOCYTE ESTERASE,UA: NORMAL
SL AMB POCT BILIRUBIN,UA: NORMAL
SL AMB POCT BLOOD,UA: NORMAL
SL AMB POCT CLARITY,UA: CLEAR
SL AMB POCT COLOR,UA: YELLOW
SL AMB POCT KETONES,UA: NORMAL
SL AMB POCT NITRITE,UA: NORMAL
SL AMB POCT PH,UA: 6
SL AMB POCT SPECIFIC GRAVITY,UA: 1.02
SL AMB POCT URINE PROTEIN: NORMAL
SL AMB POCT UROBILINOGEN: NORMAL

## 2019-01-08 PROCEDURE — 81002 URINALYSIS NONAUTO W/O SCOPE: CPT

## 2019-01-08 PROCEDURE — 51720 TREATMENT OF BLADDER LESION: CPT

## 2019-01-08 NOTE — LETTER
January 8, 2019     Patient: Tim Montalvo   YOB: 1955   Date of Visit: 1/8/2019       To Whom it May Concern:    Tim Montalvo is under my professional care  He was seen in my office on 1/8/2019  He may return to work on 01/10/2019  If you have any questions or concerns, please don't hesitate to call           Sincerely,          Dr Star Nunez      CC: No Recipients

## 2019-01-08 NOTE — PROGRESS NOTES
1/8/2019    Vikki Hernandez  1955  [id number]    Diagnosis  Chief Complaint     Bladder Cancer          Patient presents for BCG 1 of 6 managed by Dr Migel Thomas This Encounter   Procedures    POCT urine dip     Patient will follow up as scheduled in one week for next treatment  Written consent obtained today and a copy of written aftercare instructions provided  Patient instructed to call with persistent hematuria, fever, or other concerns  Procedure BCG treatment 1 of 6  Vitals:    01/08/19 1531   BP: 158/88   Pulse: 80   Weight: 82 kg (180 lb 12 8 oz)   Height: 5' 8" (1 727 m)       Recent Results (from the past 4 hour(s))   POCT urine dip    Collection Time: 01/08/19  3:35 PM   Result Value Ref Range    LEUKOCYTE ESTERASE,UA -     NITRITE,UA -     SL AMB POCT UROBILINOGEN -     POCT URINE PROTEIN -      PH,UA 6 0     BLOOD,UA -     SPECIFIC GRAVITY,UA 1 020     KETONES,UA -     BILIRUBIN,UA -     GLUCOSE, UA -      COLOR,UA yellow     CLARITY,UA clear        Urinary Incontinence Screening      Most Recent Value   Urinary Incontinence   Urinary Incontinence? No   Incomplete emptying? No   Urinary frequency? No   Urinary urgency? Yes   Urinary hesitancy? No   Dysuria (painful difficult urination)? No   Nocturia (waking up to use the bathroom)? Yes [1x]   Straining (having to push to go)? No   Weak stream?  No   Intermittent stream?  No   Post void dribbling? No               Bladder instillation     Date/Time 1/8/2019 4:08 PM     Performed by  Marylene Gates by Osiel Torres       Consent: Written consent obtained  Consent given by: patient  Patient understanding: patient states understanding of the procedure being performed             Sterile technique employed  Patient prepped and identified  16F Straight catheter placed  Bladder drained, residual approximately 30mL   The following solution was instilled directly into the bladder via catheter: 1 Vial BCG  Catheter removed  Patient discharged  Patient tolerated procedure       Administrations This Visit     BCG (MATT BCG) intravesical suspension 50 mg     Admin Date  01/08/2019 Action  Given Dose  50 mg Route  Bladder Instillation Administered By  IDALIA Brantley, RN  CURN, BSN

## 2019-01-15 ENCOUNTER — PROCEDURE VISIT (OUTPATIENT)
Dept: UROLOGY | Facility: AMBULATORY SURGERY CENTER | Age: 64
End: 2019-01-15
Payer: COMMERCIAL

## 2019-01-15 VITALS
WEIGHT: 181.4 LBS | DIASTOLIC BLOOD PRESSURE: 88 MMHG | HEART RATE: 68 BPM | SYSTOLIC BLOOD PRESSURE: 152 MMHG | BODY MASS INDEX: 27.49 KG/M2 | HEIGHT: 68 IN

## 2019-01-15 DIAGNOSIS — C67.9 MALIGNANT NEOPLASM OF URINARY BLADDER, UNSPECIFIED SITE (HCC): Primary | ICD-10-CM

## 2019-01-15 LAB
SL AMB  POCT GLUCOSE, UA: ABNORMAL
SL AMB LEUKOCYTE ESTERASE,UA: ABNORMAL
SL AMB POCT BILIRUBIN,UA: ABNORMAL
SL AMB POCT BLOOD,UA: ABNORMAL
SL AMB POCT CLARITY,UA: CLEAR
SL AMB POCT COLOR,UA: YELLOW
SL AMB POCT KETONES,UA: ABNORMAL
SL AMB POCT NITRITE,UA: ABNORMAL
SL AMB POCT PH,UA: 6
SL AMB POCT SPECIFIC GRAVITY,UA: 1.01
SL AMB POCT URINE PROTEIN: ABNORMAL
SL AMB POCT UROBILINOGEN: ABNORMAL

## 2019-01-15 PROCEDURE — 51720 TREATMENT OF BLADDER LESION: CPT

## 2019-01-15 PROCEDURE — 81002 URINALYSIS NONAUTO W/O SCOPE: CPT

## 2019-01-15 NOTE — LETTER
January 15, 2019     Patient: Tomer Blank   YOB: 1955   Date of Visit: 1/15/2019       To Whom it May Concern:    Tomer Blank is under my professional care  He was seen in my office on 1/15/2019  He may return to work on 01/17/2019  Patient has weekly appts on Tuesday's at 3 pm through 2/12/2019  He may return to work each week on Thursday  If you have any questions or concerns, please don't hesitate to call           Sincerely,          Dr Roge Ryan      CC: No Recipients

## 2019-01-15 NOTE — PROGRESS NOTES
1/15/2019    Claribel Frias  1955  [id number]    Diagnosis  Chief Complaint     Bladder Cancer          Patient presents for BCG 2 of 6 managed by Dr Sarah Bustamante This Encounter   Procedures    POCT urine dip     Patient will follow up in one week for next treatment  Patient instructed to call with persistent hematuria, fever, or other concerns  Procedure BCG treatment 2 of 6  Vitals:    01/15/19 1447   BP: 152/88   Pulse: 68   Weight: 82 3 kg (181 lb 6 4 oz)   Height: 5' 8" (1 727 m)       Recent Results (from the past 4 hour(s))   POCT urine dip    Collection Time: 01/15/19  2:51 PM   Result Value Ref Range    LEUKOCYTE ESTERASE,UA -     NITRITE,UA -     SL AMB POCT UROBILINOGEN -     POCT URINE PROTEIN -      PH,UA 6 0     BLOOD,UA -     SPECIFIC GRAVITY,UA 1 015     KETONES,UA -     BILIRUBIN,UA -     GLUCOSE, UA 2+      COLOR,UA yellow     CLARITY,UA clear        Urinary Incontinence Screening      Most Recent Value   Urinary Incontinence   Urinary Incontinence? No   Incomplete emptying? No   Urinary frequency? No   Urinary urgency? No   Urinary hesitancy? No   Dysuria (painful difficult urination)? No   Nocturia (waking up to use the bathroom)? Yes [1x]   Straining (having to push to go)? No   Weak stream?  No   Intermittent stream?  No   Post void dribbling? No        Patient reports little to no side effects after last treatment  Tolerating well thus far  Bladder instillation     Date/Time 1/15/2019 3:05 PM     Performed by  Florinda Moe     Authorized by Jomar Sanford              Sterile technique employed  Patient prepped and identified  16F Straight catheter placed  Bladder drained, residual approximately 60mL  The following solution was instilled directly into the bladder via catheter: 1 Vial BCG  Catheter removed  Patient discharged  Patient tolerated procedure       Administrations This Visit     BCG (MATT BCG) intravesical suspension 50 mg     Admin Date  01/15/2019 Action  Given Dose  50 mg Route  Bladder Instillation Administered By  IDALIA Woodward RN CURN, BSN

## 2019-01-22 ENCOUNTER — PROCEDURE VISIT (OUTPATIENT)
Dept: UROLOGY | Facility: AMBULATORY SURGERY CENTER | Age: 64
End: 2019-01-22
Payer: COMMERCIAL

## 2019-01-22 VITALS
DIASTOLIC BLOOD PRESSURE: 78 MMHG | WEIGHT: 180 LBS | HEIGHT: 68 IN | BODY MASS INDEX: 27.28 KG/M2 | SYSTOLIC BLOOD PRESSURE: 142 MMHG | HEART RATE: 72 BPM

## 2019-01-22 DIAGNOSIS — C67.9 MALIGNANT NEOPLASM OF URINARY BLADDER, UNSPECIFIED SITE (HCC): Primary | ICD-10-CM

## 2019-01-22 PROCEDURE — 51720 TREATMENT OF BLADDER LESION: CPT

## 2019-01-22 PROCEDURE — 81002 URINALYSIS NONAUTO W/O SCOPE: CPT

## 2019-01-22 NOTE — PROGRESS NOTES
1/22/2019    Robbie Staples  1955  [id number]    Diagnosis  Chief Complaint     Bladder Cancer          Patient presents for BCG 3 of 6 managed by Dr Kiya Arias This Encounter   Procedures    POCT urine dip     Patient will follow up as scheduled in one week for next treatment  Patient instructed to call with persistent hematuria, fever, or other concerns  Procedure BCG treatment 3 of 6  Vitals:    01/22/19 1504   BP: 142/78   Pulse: 72   Weight: 81 6 kg (180 lb)   Height: 5' 8" (1 727 m)       Recent Results (from the past 4 hour(s))   POCT urine dip    Collection Time: 01/22/19  3:08 PM   Result Value Ref Range    LEUKOCYTE ESTERASE,UA -     NITRITE,UA -     SL AMB POCT UROBILINOGEN -     POCT URINE PROTEIN -      PH,UA 6 0     BLOOD,UA -     SPECIFIC GRAVITY,UA 1 020     KETONES,UA -     BILIRUBIN,UA -     GLUCOSE, UA -      COLOR,UA yellow     CLARITY,UA clear        Urinary Incontinence Screening      Most Recent Value   Urinary Incontinence   Urinary Incontinence? No   Incomplete emptying? No   Urinary frequency? No   Urinary urgency? No   Urinary hesitancy? No   Dysuria (painful difficult urination)? No   Nocturia (waking up to use the bathroom)? No   Straining (having to push to go)? No   Weak stream?  No   Intermittent stream?  No   Post void dribbling? No        Patient continues to deny side effects thus far  Tolerating well  Bladder instillation     Date/Time 1/22/2019 3:15 PM     Performed by  Dalton Westbrook     Authorized by Dawson Garcia              Sterile technique employed  Patient prepped and identified  16F Straight catheter placed  Bladder drained, residual approximately <30mL  The following solution was instilled directly into the bladder via catheter: 1 Vial BCG  Catheter removed  Patient discharged  Patient tolerated procedure well       Administrations This Visit     BCG (MATT BCG) intravesical suspension 50 mg     Admin Date  01/22/2019 Action  Given Dose  50 mg Route  Bladder Instillation Administered By  IDALIA Ibarra, RN  CURN, BSN

## 2019-01-29 ENCOUNTER — PROCEDURE VISIT (OUTPATIENT)
Dept: UROLOGY | Facility: AMBULATORY SURGERY CENTER | Age: 64
End: 2019-01-29
Payer: COMMERCIAL

## 2019-01-29 VITALS
HEIGHT: 68 IN | BODY MASS INDEX: 27.4 KG/M2 | DIASTOLIC BLOOD PRESSURE: 90 MMHG | SYSTOLIC BLOOD PRESSURE: 150 MMHG | WEIGHT: 180.8 LBS | HEART RATE: 80 BPM

## 2019-01-29 DIAGNOSIS — C67.9 MALIGNANT NEOPLASM OF URINARY BLADDER, UNSPECIFIED SITE (HCC): Primary | ICD-10-CM

## 2019-01-29 LAB
SL AMB  POCT GLUCOSE, UA: NORMAL
SL AMB LEUKOCYTE ESTERASE,UA: NORMAL
SL AMB POCT BILIRUBIN,UA: NORMAL
SL AMB POCT BLOOD,UA: NORMAL
SL AMB POCT CLARITY,UA: CLEAR
SL AMB POCT COLOR,UA: YELLOW
SL AMB POCT KETONES,UA: NORMAL
SL AMB POCT NITRITE,UA: NORMAL
SL AMB POCT PH,UA: 6
SL AMB POCT SPECIFIC GRAVITY,UA: 1.01
SL AMB POCT URINE PROTEIN: NORMAL
SL AMB POCT UROBILINOGEN: NORMAL

## 2019-01-29 PROCEDURE — 51720 TREATMENT OF BLADDER LESION: CPT

## 2019-01-29 PROCEDURE — 81002 URINALYSIS NONAUTO W/O SCOPE: CPT

## 2019-01-29 NOTE — PROGRESS NOTES
1/29/2019    Jero Morel  1955  [id number]    Diagnosis  Chief Complaint     Bladder Cancer          Patient presents for BCG 4 of 6 managed by Dr Short Billing This Encounter   Procedures    POCT urine dip     Patient will follow up as scheduled in one week for next treatment  Patient instructed to call with persistent hematuria, fever, or other concerns  Procedure BCG treatment 4 of 6  Vitals:    01/29/19 1359   BP: 150/90   Pulse: 80   Weight: 82 kg (180 lb 12 8 oz)   Height: 5' 8" (1 727 m)       Recent Results (from the past 4 hour(s))   POCT urine dip    Collection Time: 01/29/19  2:03 PM   Result Value Ref Range    LEUKOCYTE ESTERASE,UA -     NITRITE,UA -     SL AMB POCT UROBILINOGEN -     POCT URINE PROTEIN -      PH,UA 6 0     BLOOD,UA trace     SPECIFIC GRAVITY,UA 1 015     KETONES,UA -     BILIRUBIN,UA -     GLUCOSE, UA -      COLOR,UA yellow     CLARITY,UA clear        Urinary Incontinence Screening      Most Recent Value   Urinary Incontinence   Urinary Incontinence? No   Incomplete emptying? No   Urinary frequency? No   Urinary urgency? No   Urinary hesitancy? No   Dysuria (painful difficult urination)? No   Nocturia (waking up to use the bathroom)? No   Straining (having to push to go)? No   Weak stream?  No   Intermittent stream?  No   Post void dribbling? No               Bladder instillation     Date/Time 1/29/2019 2:10 PM     Performed by  Jad Rodriguez     Authorized by PRICILA Wallace              Sterile technique employed  Patient prepped and identified  16F Straight catheter placed  Bladder drained, residual approximately <15mL  The following solution was instilled directly into the bladder via catheter: 1 Vial BCG  Catheter removed  Patient discharged  Patient tolerated procedure       Administrations This Visit     BCG (MATT BCG) intravesical suspension 50 mg     Admin Date  01/29/2019 Action  Given Dose  50 mg Route  Bladder Instillation Administered By  JAX Seth RN CURN, BSN

## 2019-02-05 ENCOUNTER — PROCEDURE VISIT (OUTPATIENT)
Dept: UROLOGY | Facility: AMBULATORY SURGERY CENTER | Age: 64
End: 2019-02-05
Payer: COMMERCIAL

## 2019-02-05 VITALS
HEART RATE: 80 BPM | DIASTOLIC BLOOD PRESSURE: 72 MMHG | BODY MASS INDEX: 27.28 KG/M2 | WEIGHT: 180 LBS | SYSTOLIC BLOOD PRESSURE: 140 MMHG | HEIGHT: 68 IN

## 2019-02-05 DIAGNOSIS — C67.9 MALIGNANT NEOPLASM OF URINARY BLADDER, UNSPECIFIED SITE (HCC): Primary | ICD-10-CM

## 2019-02-05 LAB
SL AMB  POCT GLUCOSE, UA: ABNORMAL
SL AMB LEUKOCYTE ESTERASE,UA: ABNORMAL
SL AMB POCT BILIRUBIN,UA: ABNORMAL
SL AMB POCT BLOOD,UA: ABNORMAL
SL AMB POCT CLARITY,UA: CLEAR
SL AMB POCT COLOR,UA: YELLOW
SL AMB POCT KETONES,UA: ABNORMAL
SL AMB POCT NITRITE,UA: ABNORMAL
SL AMB POCT PH,UA: 6
SL AMB POCT SPECIFIC GRAVITY,UA: 1.02
SL AMB POCT URINE PROTEIN: ABNORMAL
SL AMB POCT UROBILINOGEN: ABNORMAL

## 2019-02-05 PROCEDURE — 51720 TREATMENT OF BLADDER LESION: CPT | Performed by: UROLOGY

## 2019-02-05 PROCEDURE — 81002 URINALYSIS NONAUTO W/O SCOPE: CPT | Performed by: UROLOGY

## 2019-02-05 NOTE — PROGRESS NOTES
2/5/2019    Yuli Acevedo  1955  [id number]    Diagnosis  Chief Complaint     Bladder Cancer          Patient presents for BCG 5 of 6 managed by Dr Leopoldo Goodnight This Encounter   Procedures    POCT urine dip     Patient will follow up in one week for last treatment of this induction course  Patient instructed to call with persistent hematuria, fever, or other concerns  Procedure BCG treatment 5 of 6  Vitals:    02/05/19 1454   BP: 140/72   Pulse: 80   Weight: 81 6 kg (180 lb)   Height: 5' 8" (1 727 m)       Recent Results (from the past 4 hour(s))   POCT urine dip    Collection Time: 02/05/19  2:57 PM   Result Value Ref Range    LEUKOCYTE ESTERASE,UA -     NITRITE,UA -     SL AMB POCT UROBILINOGEN -     POCT URINE PROTEIN -      PH,UA 6 0     BLOOD,UA -     SPECIFIC GRAVITY,UA 1 025     KETONES,UA -     BILIRUBIN,UA -     GLUCOSE, UA 1+      COLOR,UA yellow     CLARITY,UA clear        Urinary Incontinence Screening      Most Recent Value   Urinary Incontinence   Urinary Incontinence? No   Incomplete emptying? No   Urinary urgency? No   Urinary hesitancy? No   Dysuria (painful difficult urination)? No   Nocturia (waking up to use the bathroom)? Yes [1x]   Straining (having to push to go)? No   Weak stream?  No   Intermittent stream?  No   Post void dribbling? No      Overall tolerating well  Noted small drop of blood in the urine with first void after last treatment which resolved  Also noted flu like symptoms the following day, these also resolved on their own  Bladder instillation     Date/Time 2/5/2019 3:17 PM     Performed by  Chay Denny     Authorized by Valerie Mandujano              Sterile technique employed  Patient prepped and identified  16F Straight catheter placed  Bladder drained, residual approximately <30mL  The following solution was instilled directly into the bladder via catheter: 1 Vial BCG  Catheter removed  Patient discharged   Patient tolerated procedure       Administrations This Visit     BCG (MATT BCG) intravesical suspension 50 mg     Admin Date  02/05/2019 Action  Given Dose  50 mg Route  Bladder Instillation Administered By  Marina Roberts, RN                  Marina Roberts, RN  LUIS, BSN

## 2019-02-12 ENCOUNTER — PROCEDURE VISIT (OUTPATIENT)
Dept: UROLOGY | Facility: AMBULATORY SURGERY CENTER | Age: 64
End: 2019-02-12
Payer: COMMERCIAL

## 2019-02-12 VITALS
SYSTOLIC BLOOD PRESSURE: 140 MMHG | BODY MASS INDEX: 27.37 KG/M2 | DIASTOLIC BLOOD PRESSURE: 88 MMHG | HEART RATE: 60 BPM | WEIGHT: 180.6 LBS | HEIGHT: 68 IN

## 2019-02-12 DIAGNOSIS — C67.9 MALIGNANT NEOPLASM OF URINARY BLADDER, UNSPECIFIED SITE (HCC): Primary | ICD-10-CM

## 2019-02-12 PROCEDURE — 51720 TREATMENT OF BLADDER LESION: CPT

## 2019-02-12 PROCEDURE — 81002 URINALYSIS NONAUTO W/O SCOPE: CPT

## 2019-02-12 NOTE — PROGRESS NOTES
2/12/2019    Dafne Marquez  1955  [id number]    Diagnosis  Chief Complaint     Bladder Cancer          Patient presents for BCG 6 of 6 managed by Dr Trini Sullivan This Encounter   Procedures    POCT urine dip     Patient will follow up in one week for OR planning s/p BCG induction with Dr Amadeo Shetty   Patient instructed to call with persistent hematuria, fever, or other concerns  Procedure BCG treatment 6 of 6  Vitals:    02/12/19 1333   BP: 140/88   Pulse: 60   Weight: 81 9 kg (180 lb 9 6 oz)   Height: 5' 8" (1 727 m)       Recent Results (from the past 4 hour(s))   POCT urine dip    Collection Time: 02/12/19  1:39 PM   Result Value Ref Range    LEUKOCYTE ESTERASE,UA -     NITRITE,UA -     SL AMB POCT UROBILINOGEN -     POCT URINE PROTEIN -      PH,UA 6 0     BLOOD,UA -     SPECIFIC GRAVITY,UA 1 025     KETONES,UA -     BILIRUBIN,UA -     GLUCOSE, UA -      COLOR,UA yellow     CLARITY,UA clear        Urinary Incontinence Screening      Most Recent Value   Urinary Incontinence   Urinary Incontinence? No   Incomplete emptying? No   Urinary frequency? No   Urinary urgency? No   Urinary hesitancy? No   Dysuria (painful difficult urination)? No   Nocturia (waking up to use the bathroom)? Yes [1x]   Straining (having to push to go)? No   Weak stream?  No   Intermittent stream?  No   Post void dribbling? No               Bladder instillation     Date/Time 2/12/2019 1:45 PM     Performed by  Darwin Cordero RN     Authorized by Nuris Ocampo MD              Sterile technique employed  Patient prepped and identified  16F Straight catheter placed  Bladder drained, residual approximately <30mL  The following solution was instilled directly into the bladder via catheter: 1 Vial BCG  Catheter removed  Patient discharged  Patient tolerated procedure       Administrations This Visit     BCG (MATT BCG) intravesical suspension 50 mg     Admin Date  02/12/2019 Action  Given Dose  50 mg Route  Bladder Instillation Administered By  Elmer Gomes, RN                Elmer Gomes, RN  LUIS,BSN

## 2019-02-18 ENCOUNTER — OFFICE VISIT (OUTPATIENT)
Dept: UROLOGY | Facility: AMBULATORY SURGERY CENTER | Age: 64
End: 2019-02-18
Payer: COMMERCIAL

## 2019-02-18 VITALS
HEART RATE: 72 BPM | HEIGHT: 68 IN | WEIGHT: 180 LBS | BODY MASS INDEX: 27.28 KG/M2 | DIASTOLIC BLOOD PRESSURE: 78 MMHG | SYSTOLIC BLOOD PRESSURE: 142 MMHG

## 2019-02-18 DIAGNOSIS — C67.9 UROTHELIAL CARCINOMA OF BLADDER (HCC): Primary | ICD-10-CM

## 2019-02-18 PROCEDURE — 99214 OFFICE O/P EST MOD 30 MIN: CPT | Performed by: UROLOGY

## 2019-02-18 RX ORDER — CEFAZOLIN SODIUM 1 G/50ML
1000 SOLUTION INTRAVENOUS ONCE
Status: CANCELLED | OUTPATIENT
Start: 2019-02-18 | End: 2019-02-18
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
## 2019-02-25 ENCOUNTER — HOSPITAL ENCOUNTER (OUTPATIENT)
Dept: RADIOLOGY | Age: 64
Discharge: HOME | End: 2019-02-25
Payer: COMMERCIAL

## 2019-02-25 DIAGNOSIS — C67.9 UROTHELIAL CARCINOMA OF BLADDER (HCC): ICD-10-CM

## 2019-02-25 PROCEDURE — 74178 CT ABD&PLV WO CNTR FLWD CNTR: CPT

## 2019-02-25 RX ADMIN — IOHEXOL 100 ML: 350 INJECTION, SOLUTION INTRAVENOUS at 09:47

## 2019-03-06 ENCOUNTER — OFFICE VISIT (OUTPATIENT)
Dept: INTERNAL MEDICINE CLINIC | Facility: CLINIC | Age: 64
End: 2019-03-06
Payer: COMMERCIAL

## 2019-03-06 VITALS
HEART RATE: 80 BPM | WEIGHT: 178.8 LBS | BODY MASS INDEX: 27.1 KG/M2 | RESPIRATION RATE: 14 BRPM | OXYGEN SATURATION: 96 % | DIASTOLIC BLOOD PRESSURE: 70 MMHG | SYSTOLIC BLOOD PRESSURE: 128 MMHG | HEIGHT: 68 IN | TEMPERATURE: 102.4 F

## 2019-03-06 DIAGNOSIS — R68.89 FLU-LIKE SYMPTOMS: Primary | ICD-10-CM

## 2019-03-06 PROCEDURE — 99213 OFFICE O/P EST LOW 20 MIN: CPT | Performed by: NURSE PRACTITIONER

## 2019-03-06 RX ORDER — OSELTAMIVIR PHOSPHATE 75 MG/1
75 CAPSULE ORAL 2 TIMES DAILY
Qty: 10 CAPSULE | Refills: 0 | Status: SHIPPED | OUTPATIENT
Start: 2019-03-06 | End: 2019-03-11

## 2019-03-06 NOTE — ASSESSMENT & PLAN NOTE
Symptoms highly suggestive of influenza  Patient is a within treatment window for Tamiflu  Prescription sent for five-day treatment  Encourage fluids and rest   Patient instructed to remain home from work until he is without fever times 24 hours    Patient advised to call the office if symptoms worsen or persist

## 2019-03-06 NOTE — PROGRESS NOTES
Assessment/Plan:    Flu-like symptoms  Symptoms highly suggestive of influenza  Patient is a within treatment window for Tamiflu  Prescription sent for five-day treatment  Encourage fluids and rest   Patient instructed to remain home from work until he is without fever times 24 hours  Patient advised to call the office if symptoms worsen or persist        Diagnoses and all orders for this visit:    Flu-like symptoms  -     oseltamivir (TAMIFLU) 75 mg capsule; Take 1 capsule (75 mg total) by mouth 2 (two) times a day for 5 days          Subjective:      Patient ID: oCrtez Saavedra is a 59 y o  male  Pt is a 59 y o  y/o male who is seen today for evaluation of flu like symptoms including body aches and fever that started on Monday night  He also complains of a non-productive cough with a mild headache, nasal congestion  His appetite is poor with no n/v/d  He has been staying hydrated but is not eating much  He denies shortness of breath, ear pain, dizziness        The following portions of the patient's history were reviewed and updated as appropriate: allergies, current medications, past family history, past medical history, past social history, past surgical history and problem list     Review of Systems   Constitutional: Positive for appetite change, chills, diaphoresis, fatigue and fever  HENT: Positive for congestion  Negative for hearing loss, sinus pressure and sore throat  Respiratory: Negative for cough, chest tightness, shortness of breath and wheezing  Gastrointestinal: Negative for abdominal pain, diarrhea, nausea and vomiting  Genitourinary: Negative for dysuria  Musculoskeletal: Positive for myalgias  Neurological: Positive for light-headedness and headaches  Negative for dizziness  Hematological: Negative for adenopathy           Objective:      /70   Pulse 80   Temp (!) 102 4 °F (39 1 °C)   Resp 14   Ht 5' 8" (1 727 m)   Wt 81 1 kg (178 lb 12 8 oz)   SpO2 96%   BMI 27 19 kg/m²          Physical Exam   Constitutional: He is oriented to person, place, and time  Vital signs are normal  He appears well-developed and well-nourished  He is cooperative  HENT:   Right Ear: Tympanic membrane, external ear and ear canal normal  No middle ear effusion  Decreased hearing is noted  Left Ear: Tympanic membrane, external ear and ear canal normal   No middle ear effusion  Decreased hearing is noted  Nose: Mucosal edema and rhinorrhea present  Mouth/Throat: Mucous membranes are not dry  No oropharyngeal exudate, posterior oropharyngeal edema, posterior oropharyngeal erythema or tonsillar abscesses  Swelling in the left nasal mucosa with redness and bleeding in the right nasal passage  Postnasal drip noted   Eyes: Conjunctivae are normal    Cardiovascular: Normal rate, regular rhythm, normal heart sounds and normal pulses  Pulmonary/Chest: Effort normal and breath sounds normal    Musculoskeletal: Normal range of motion  Lymphadenopathy:        Head (right side): No submental, no submandibular, no tonsillar, no preauricular, no posterior auricular and no occipital adenopathy present  Head (left side): No submental, no submandibular, no tonsillar, no preauricular, no posterior auricular and no occipital adenopathy present  He has cervical adenopathy  Neurological: He is alert and oriented to person, place, and time  Skin: Skin is warm, dry and intact  Psychiatric: He has a normal mood and affect  His speech is normal and behavior is normal  Judgment and thought content normal  Cognition and memory are normal    Vitals reviewed

## 2019-03-06 NOTE — LETTER
March 6, 2019     Patient: Dorie Leroy   YOB: 1955   Date of Visit: 3/6/2019       To Whom it May Concern:    Dorie Leroy is under my professional care  He was seen in my office on 3/6/2019  He may return to work on 3/8/19  If you have any questions or concerns, please don't hesitate to call           Sincerely,          JUANITA Kumari        CC: No Recipients

## 2019-03-13 ENCOUNTER — TELEPHONE (OUTPATIENT)
Dept: UROLOGY | Facility: CLINIC | Age: 64
End: 2019-03-13

## 2019-03-15 ENCOUNTER — TELEPHONE (OUTPATIENT)
Dept: UROLOGY | Facility: MEDICAL CENTER | Age: 64
End: 2019-03-15

## 2019-03-15 NOTE — TELEPHONE ENCOUNTER
Spoke to Patient had questions about where to go for PAT's  He plans to go to [facility] and orders are in his chart  Also confirmed PRE-OP visit with Berny @ [facility] office 3/29/19 2 9:45am   Thank you!

## 2019-03-15 NOTE — TELEPHONE ENCOUNTER
Patient is scheduled for surgery on 04/10/2019  Patient would like to speak to you in reference to paperwork he received

## 2019-03-18 ENCOUNTER — APPOINTMENT (OUTPATIENT)
Dept: LAB | Age: 64
End: 2019-03-18
Payer: COMMERCIAL

## 2019-03-18 ENCOUNTER — LAB (OUTPATIENT)
Dept: LAB | Age: 64
End: 2019-03-18
Payer: COMMERCIAL

## 2019-03-18 DIAGNOSIS — C67.9 MALIGNANT NEOPLASM OF URINARY BLADDER, UNSPECIFIED SITE (HCC): ICD-10-CM

## 2019-03-18 DIAGNOSIS — C67.9 UROTHELIAL CARCINOMA OF BLADDER (HCC): ICD-10-CM

## 2019-03-18 LAB
ANION GAP SERPL CALCULATED.3IONS-SCNC: 6 MMOL/L (ref 4–13)
BUN SERPL-MCNC: 17 MG/DL (ref 5–25)
CALCIUM SERPL-MCNC: 9.7 MG/DL (ref 8.3–10.1)
CHLORIDE SERPL-SCNC: 104 MMOL/L (ref 100–108)
CO2 SERPL-SCNC: 27 MMOL/L (ref 21–32)
CREAT SERPL-MCNC: 0.81 MG/DL (ref 0.6–1.3)
GFR SERPL CREATININE-BSD FRML MDRD: 95 ML/MIN/1.73SQ M
GLUCOSE P FAST SERPL-MCNC: 104 MG/DL (ref 65–99)
POTASSIUM SERPL-SCNC: 4.7 MMOL/L (ref 3.5–5.3)
SODIUM SERPL-SCNC: 137 MMOL/L (ref 136–145)

## 2019-03-18 PROCEDURE — 87086 URINE CULTURE/COLONY COUNT: CPT

## 2019-03-18 PROCEDURE — 93005 ELECTROCARDIOGRAM TRACING: CPT

## 2019-03-18 PROCEDURE — 80048 BASIC METABOLIC PNL TOTAL CA: CPT

## 2019-03-18 PROCEDURE — 36415 COLL VENOUS BLD VENIPUNCTURE: CPT

## 2019-03-19 LAB
ATRIAL RATE: 63 BPM
BACTERIA UR CULT: NORMAL
P AXIS: 52 DEGREES
PR INTERVAL: 146 MS
QRS AXIS: 21 DEGREES
QRSD INTERVAL: 84 MS
QT INTERVAL: 424 MS
QTC INTERVAL: 433 MS
T WAVE AXIS: 43 DEGREES
VENTRICULAR RATE: 63 BPM

## 2019-03-19 PROCEDURE — 93010 ELECTROCARDIOGRAM REPORT: CPT | Performed by: INTERNAL MEDICINE

## 2019-03-22 ENCOUNTER — TELEPHONE (OUTPATIENT)
Dept: UROLOGY | Facility: AMBULATORY SURGERY CENTER | Age: 64
End: 2019-03-22

## 2019-03-22 NOTE — TELEPHONE ENCOUNTER
Faxed 5 records requests for 5 different dates to [facility] on 3-22-19  The requests are from Kamaljit Corbin

## 2019-03-29 ENCOUNTER — OFFICE VISIT (OUTPATIENT)
Dept: UROLOGY | Facility: AMBULATORY SURGERY CENTER | Age: 64
End: 2019-03-29
Payer: COMMERCIAL

## 2019-03-29 ENCOUNTER — ANESTHESIA EVENT (OUTPATIENT)
Dept: PERIOP | Facility: AMBULARY SURGERY CENTER | Age: 64
End: 2019-03-29
Payer: COMMERCIAL

## 2019-03-29 VITALS
HEIGHT: 68 IN | WEIGHT: 175 LBS | DIASTOLIC BLOOD PRESSURE: 80 MMHG | SYSTOLIC BLOOD PRESSURE: 130 MMHG | HEART RATE: 77 BPM | BODY MASS INDEX: 26.52 KG/M2

## 2019-03-29 DIAGNOSIS — C67.9 UROTHELIAL CARCINOMA OF BLADDER (HCC): Primary | ICD-10-CM

## 2019-03-29 PROCEDURE — 99214 OFFICE O/P EST MOD 30 MIN: CPT | Performed by: NURSE PRACTITIONER
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
## 2019-04-10 ENCOUNTER — ANESTHESIA (OUTPATIENT)
Dept: PERIOP | Facility: AMBULARY SURGERY CENTER | Age: 64
End: 2019-04-10
Payer: COMMERCIAL

## 2019-04-10 ENCOUNTER — APPOINTMENT (OUTPATIENT)
Dept: RADIOLOGY | Facility: AMBULARY SURGERY CENTER | Age: 64
End: 2019-04-10
Payer: COMMERCIAL

## 2019-04-10 ENCOUNTER — HOSPITAL ENCOUNTER (OUTPATIENT)
Facility: AMBULARY SURGERY CENTER | Age: 64
Setting detail: OUTPATIENT SURGERY
Discharge: HOME | End: 2019-04-10
Attending: UROLOGY | Admitting: UROLOGY
Payer: COMMERCIAL

## 2019-04-10 VITALS
RESPIRATION RATE: 16 BRPM | TEMPERATURE: 97.8 F | WEIGHT: 175 LBS | DIASTOLIC BLOOD PRESSURE: 74 MMHG | HEART RATE: 62 BPM | BODY MASS INDEX: 26.52 KG/M2 | HEIGHT: 68 IN | OXYGEN SATURATION: 95 % | SYSTOLIC BLOOD PRESSURE: 167 MMHG

## 2019-04-10 DIAGNOSIS — C67.9 UROTHELIAL CARCINOMA OF BLADDER (HCC): ICD-10-CM

## 2019-04-10 PROCEDURE — 88112 CYTOPATH CELL ENHANCE TECH: CPT | Performed by: PATHOLOGY

## 2019-04-10 PROCEDURE — 52351 CYSTOURETERO & OR PYELOSCOPE: CPT | Performed by: UROLOGY

## 2019-04-10 PROCEDURE — 74420 UROGRAPHY RTRGR +-KUB: CPT

## 2019-04-10 PROCEDURE — 88305 TISSUE EXAM BY PATHOLOGIST: CPT | Performed by: PATHOLOGY

## 2019-04-10 PROCEDURE — C1769 GUIDE WIRE: HCPCS | Performed by: UROLOGY

## 2019-04-10 PROCEDURE — 52204 CYSTOSCOPY W/BIOPSY(S): CPT | Performed by: UROLOGY

## 2019-04-10 RX ORDER — DEXAMETHASONE SODIUM PHOSPHATE 10 MG/ML
INJECTION, SOLUTION INTRAMUSCULAR; INTRAVENOUS AS NEEDED
Status: DISCONTINUED | OUTPATIENT
Start: 2019-04-10 | End: 2019-04-10 | Stop reason: SURG

## 2019-04-10 RX ORDER — SODIUM CHLORIDE 9 MG/ML
INJECTION, SOLUTION INTRAVENOUS CONTINUOUS PRN
Status: DISCONTINUED | OUTPATIENT
Start: 2019-04-10 | End: 2019-04-10 | Stop reason: SURG

## 2019-04-10 RX ORDER — ONDANSETRON 2 MG/ML
INJECTION INTRAMUSCULAR; INTRAVENOUS AS NEEDED
Status: DISCONTINUED | OUTPATIENT
Start: 2019-04-10 | End: 2019-04-10 | Stop reason: SURG

## 2019-04-10 RX ORDER — CEPHALEXIN 500 MG/1
500 CAPSULE ORAL EVERY 12 HOURS SCHEDULED
Qty: 6 CAPSULE | Refills: 0 | Status: SHIPPED | OUTPATIENT
Start: 2019-04-10 | End: 2019-04-13

## 2019-04-10 RX ORDER — CEFAZOLIN SODIUM 1 G/50ML
1000 SOLUTION INTRAVENOUS ONCE
Status: COMPLETED | OUTPATIENT
Start: 2019-04-10 | End: 2019-04-10

## 2019-04-10 RX ORDER — LIDOCAINE HYDROCHLORIDE 10 MG/ML
INJECTION, SOLUTION INFILTRATION; PERINEURAL AS NEEDED
Status: DISCONTINUED | OUTPATIENT
Start: 2019-04-10 | End: 2019-04-10 | Stop reason: SURG

## 2019-04-10 RX ORDER — IBUPROFEN 600 MG/1
600 TABLET ORAL EVERY 6 HOURS PRN
Status: DISCONTINUED | OUTPATIENT
Start: 2019-04-10 | End: 2019-04-10 | Stop reason: HOSPADM

## 2019-04-10 RX ORDER — MIDAZOLAM HYDROCHLORIDE 1 MG/ML
INJECTION INTRAMUSCULAR; INTRAVENOUS AS NEEDED
Status: DISCONTINUED | OUTPATIENT
Start: 2019-04-10 | End: 2019-04-10 | Stop reason: SURG

## 2019-04-10 RX ORDER — SODIUM CHLORIDE 9 MG/ML
75 INJECTION, SOLUTION INTRAVENOUS CONTINUOUS
Status: DISCONTINUED | OUTPATIENT
Start: 2019-04-10 | End: 2019-04-10 | Stop reason: HOSPADM

## 2019-04-10 RX ORDER — FENTANYL CITRATE/PF 50 MCG/ML
25 SYRINGE (ML) INJECTION
Status: DISCONTINUED | OUTPATIENT
Start: 2019-04-10 | End: 2019-04-10 | Stop reason: HOSPADM

## 2019-04-10 RX ORDER — FENTANYL CITRATE 50 UG/ML
INJECTION, SOLUTION INTRAMUSCULAR; INTRAVENOUS AS NEEDED
Status: DISCONTINUED | OUTPATIENT
Start: 2019-04-10 | End: 2019-04-10 | Stop reason: SURG

## 2019-04-10 RX ORDER — PROPOFOL 10 MG/ML
INJECTION, EMULSION INTRAVENOUS AS NEEDED
Status: DISCONTINUED | OUTPATIENT
Start: 2019-04-10 | End: 2019-04-10 | Stop reason: SURG

## 2019-04-10 RX ORDER — ONDANSETRON 2 MG/ML
4 INJECTION INTRAMUSCULAR; INTRAVENOUS ONCE AS NEEDED
Status: DISCONTINUED | OUTPATIENT
Start: 2019-04-10 | End: 2019-04-10 | Stop reason: HOSPADM

## 2019-04-10 RX ADMIN — FENTANYL CITRATE 25 MCG: 50 INJECTION, SOLUTION INTRAMUSCULAR; INTRAVENOUS at 09:20

## 2019-04-10 RX ADMIN — MIDAZOLAM HYDROCHLORIDE 2 MG: 1 INJECTION, SOLUTION INTRAMUSCULAR; INTRAVENOUS at 09:04

## 2019-04-10 RX ADMIN — SODIUM CHLORIDE: 0.9 INJECTION, SOLUTION INTRAVENOUS at 08:53

## 2019-04-10 RX ADMIN — ONDANSETRON 4 MG: 2 INJECTION INTRAMUSCULAR; INTRAVENOUS at 09:08

## 2019-04-10 RX ADMIN — FENTANYL CITRATE 25 MCG: 50 INJECTION, SOLUTION INTRAMUSCULAR; INTRAVENOUS at 09:15

## 2019-04-10 RX ADMIN — LIDOCAINE HYDROCHLORIDE ANHYDROUS 50 MG: 10 INJECTION, SOLUTION INFILTRATION at 09:08

## 2019-04-10 RX ADMIN — FENTANYL CITRATE 25 MCG: 50 INJECTION, SOLUTION INTRAMUSCULAR; INTRAVENOUS at 09:08

## 2019-04-10 RX ADMIN — PROPOFOL 200 MG: 10 INJECTION, EMULSION INTRAVENOUS at 09:08

## 2019-04-10 RX ADMIN — DEXAMETHASONE SODIUM PHOSPHATE 4 MG: 10 INJECTION, SOLUTION INTRAMUSCULAR; INTRAVENOUS at 09:08

## 2019-04-10 RX ADMIN — FENTANYL CITRATE 25 MCG: 50 INJECTION, SOLUTION INTRAMUSCULAR; INTRAVENOUS at 09:12

## 2019-04-10 RX ADMIN — CEFAZOLIN SODIUM 1000 MG: 1 SOLUTION INTRAVENOUS at 09:04

## 2019-04-11 ENCOUNTER — TELEPHONE (OUTPATIENT)
Dept: UROLOGY | Facility: CLINIC | Age: 64
End: 2019-04-11

## 2019-07-29 ENCOUNTER — PROCEDURE VISIT (OUTPATIENT)
Dept: UROLOGY | Facility: AMBULATORY SURGERY CENTER | Age: 64
End: 2019-07-29
Payer: COMMERCIAL

## 2019-07-29 VITALS
HEIGHT: 68 IN | BODY MASS INDEX: 26.98 KG/M2 | WEIGHT: 178 LBS | DIASTOLIC BLOOD PRESSURE: 80 MMHG | SYSTOLIC BLOOD PRESSURE: 136 MMHG | HEART RATE: 70 BPM

## 2019-07-29 DIAGNOSIS — C67.9 MALIGNANT NEOPLASM OF URINARY BLADDER, UNSPECIFIED SITE (HCC): Primary | ICD-10-CM

## 2019-07-29 LAB
SL AMB  POCT GLUCOSE, UA: 1000
SL AMB LEUKOCYTE ESTERASE,UA: NORMAL
SL AMB POCT BILIRUBIN,UA: NORMAL
SL AMB POCT BLOOD,UA: NORMAL
SL AMB POCT CLARITY,UA: CLEAR
SL AMB POCT COLOR,UA: YELLOW
SL AMB POCT KETONES,UA: NORMAL
SL AMB POCT NITRITE,UA: NORMAL
SL AMB POCT PH,UA: 6
SL AMB POCT SPECIFIC GRAVITY,UA: 1.01
SL AMB POCT URINE PROTEIN: NORMAL
SL AMB POCT UROBILINOGEN: 0.2

## 2019-07-29 PROCEDURE — 88112 CYTOPATH CELL ENHANCE TECH: CPT | Performed by: PATHOLOGY

## 2019-07-29 PROCEDURE — 81002 URINALYSIS NONAUTO W/O SCOPE: CPT | Performed by: UROLOGY

## 2019-07-29 PROCEDURE — 52000 CYSTOURETHROSCOPY: CPT | Performed by: UROLOGY

## 2019-07-29 NOTE — LETTER
July 29, 2019     Steve Brunner, DO  [address]  [address] [place] [id number]    Patient: Ravin Valdes   YOB: 1955   Date of Visit: 7/29/2019       Dear Dr Lexi Ac: Thank you for referring Ravin Valdes to me for evaluation  Below are my notes for this consultation  If you have questions, please do not hesitate to call me  I look forward to following your patient along with you  Sincerely,        Brandi De La Cruz MD        CC: No Recipients  Brandi De La Cruz MD  7/29/2019  9:01 AM  Sign at close encounter  Patient has history of high-grade urothelial carcinoma the bladder and left joel ureteral area  He underwent induction BCG x6  He did not tolerate the last instillation well  Underwent operative cystoscopy as well as left ureteroscopy due to his prior findings in April and biopsies were negative  There was no finding of recurrence  He returns today in follow-up  CT scan February 2019 was negative for any evidence of disease  Cystoscopy  Date/Time: 7/29/2019 8:59 AM  Performed by: Brandi De La Cruz MD  Authorized by: Brandi De La Cruz MD     Additional Procedure Details: Flexible cystoscopy note     The patient was prepped and draped in sterile fashion  2% lidocaine jelly was instilled per urethra   The 16 Croatian flexible cystoscope was placed per urethra  There is no outlet obstruction  Evaluation of the bladder reveals normal right ureteral orifice  The left ureteral orifice has been previously resected and is patent  Ana Pérezck is no evidence of urothelial carcinoma or suspicious lesion   The scope was removed and the procedure was terminated  The patient tolerated the procedure well  Plan  Will check urine cytology  Follow-up cystoscopy in 3 months, and if normal 6 months

## 2019-07-29 NOTE — PROGRESS NOTES
Patient has history of high-grade urothelial carcinoma the bladder and left joel ureteral area  He underwent induction BCG x6  He did not tolerate the last instillation well  Underwent operative cystoscopy as well as left ureteroscopy due to his prior findings in April and biopsies were negative  There was no finding of recurrence  He returns today in follow-up  CT scan February 2019 was negative for any evidence of disease  Cystoscopy  Date/Time: 7/29/2019 8:59 AM  Performed by: Mir Pagan MD  Authorized by: Mir Pagan MD     Additional Procedure Details: Flexible cystoscopy note     The patient was prepped and draped in sterile fashion  2% lidocaine jelly was instilled per urethra   The 16 [place] Leonean flexible cystoscope was placed per urethra  There is no outlet obstruction  Evaluation of the bladder reveals normal right ureteral orifice  The left ureteral orifice has been previously resected and is patent  Jen Soni is no evidence of urothelial carcinoma or suspicious lesion   The scope was removed and the procedure was terminated  The patient tolerated the procedure well  Plan  Will check urine cytology  Follow-up cystoscopy in 3 months, and if normal 6 months

## 2019-08-08 ENCOUNTER — TELEPHONE (OUTPATIENT)
Dept: UROLOGY | Facility: AMBULATORY SURGERY CENTER | Age: 64
End: 2019-08-08

## 2019-08-14 ENCOUNTER — TELEPHONE (OUTPATIENT)
Dept: UROLOGY | Facility: MEDICAL CENTER | Age: 64
End: 2019-08-14

## 2019-08-14 NOTE — TELEPHONE ENCOUNTER
Patient of Dr Hyacinth Waters seen in the [place] office  Paul Keita from [place] [address] advised that he sent a request for medical records on 8/6/19  He is requesting the Doctors notes, medication list, and plan of care  Please advise

## 2019-08-16 NOTE — TELEPHONE ENCOUNTER
Received request for records from Tita Horton at [facility]  Faxed request to [facility] to get processed

## 2019-11-04 ENCOUNTER — TELEPHONE (OUTPATIENT)
Dept: UROLOGY | Facility: AMBULATORY SURGERY CENTER | Age: 64
End: 2019-11-04

## 2019-11-04 ENCOUNTER — PROCEDURE VISIT (OUTPATIENT)
Dept: UROLOGY | Facility: AMBULATORY SURGERY CENTER | Age: 64
End: 2019-11-04
Payer: COMMERCIAL

## 2019-11-04 VITALS
HEIGHT: 68 IN | SYSTOLIC BLOOD PRESSURE: 138 MMHG | DIASTOLIC BLOOD PRESSURE: 82 MMHG | WEIGHT: 181 LBS | HEART RATE: 72 BPM | BODY MASS INDEX: 27.43 KG/M2

## 2019-11-04 DIAGNOSIS — C67.9 MALIGNANT NEOPLASM OF URINARY BLADDER, UNSPECIFIED SITE (HCC): Primary | ICD-10-CM

## 2019-11-04 LAB
SL AMB  POCT GLUCOSE, UA: NORMAL
SL AMB LEUKOCYTE ESTERASE,UA: NORMAL
SL AMB POCT BILIRUBIN,UA: NORMAL
SL AMB POCT BLOOD,UA: NORMAL
SL AMB POCT CLARITY,UA: CLEAR
SL AMB POCT COLOR,UA: YELLOW
SL AMB POCT KETONES,UA: NORMAL
SL AMB POCT NITRITE,UA: NORMAL
SL AMB POCT PH,UA: 6
SL AMB POCT SPECIFIC GRAVITY,UA: 1.01
SL AMB POCT URINE PROTEIN: NORMAL
SL AMB POCT UROBILINOGEN: 0.2

## 2019-11-04 PROCEDURE — 52000 CYSTOURETHROSCOPY: CPT | Performed by: UROLOGY

## 2019-11-04 PROCEDURE — 81002 URINALYSIS NONAUTO W/O SCOPE: CPT | Performed by: UROLOGY

## 2019-11-04 PROCEDURE — 99214 OFFICE O/P EST MOD 30 MIN: CPT | Performed by: UROLOGY

## 2019-11-04 RX ORDER — CEFAZOLIN SODIUM 1 G/50ML
1000 SOLUTION INTRAVENOUS ONCE
Status: CANCELLED | OUTPATIENT
Start: 2019-11-04 | End: 2019-11-04
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
## 2019-11-04 NOTE — TELEPHONE ENCOUNTER
Confirmed TURBT/LEFT URS 12/11/19 with Dr Naz Golden @ [facility]  Instructions and testing needed discussed  POST OP made  Paperwork to be mailed tomorrow/mail already went today
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
## 2019-11-14 ENCOUNTER — APPOINTMENT (OUTPATIENT)
Dept: LAB | Age: 64
End: 2019-11-14
Payer: COMMERCIAL

## 2019-11-14 ENCOUNTER — LAB (OUTPATIENT)
Dept: LAB | Age: 64
End: 2019-11-14
Payer: COMMERCIAL

## 2019-11-14 DIAGNOSIS — C67.9 MALIGNANT NEOPLASM OF URINARY BLADDER, UNSPECIFIED SITE (HCC): ICD-10-CM

## 2019-11-14 LAB
BACTERIA UR QL AUTO: ABNORMAL /HPF
BILIRUB UR QL STRIP: NEGATIVE
CLARITY UR: CLEAR
COLOR UR: YELLOW
GLUCOSE UR STRIP-MCNC: ABNORMAL MG/DL
HGB UR QL STRIP.AUTO: NEGATIVE
HYALINE CASTS #/AREA URNS LPF: ABNORMAL /LPF
KETONES UR STRIP-MCNC: NEGATIVE MG/DL
LEUKOCYTE ESTERASE UR QL STRIP: NEGATIVE
NITRITE UR QL STRIP: NEGATIVE
NON-SQ EPI CELLS URNS QL MICRO: ABNORMAL /HPF
PH UR STRIP.AUTO: 7 [PH]
PROT UR STRIP-MCNC: NEGATIVE MG/DL
RBC #/AREA URNS AUTO: ABNORMAL /HPF
SP GR UR STRIP.AUTO: 1.02 (ref 1–1.03)
UROBILINOGEN UR QL STRIP.AUTO: 1 E.U./DL
WBC #/AREA URNS AUTO: ABNORMAL /HPF

## 2019-11-14 PROCEDURE — 93005 ELECTROCARDIOGRAM TRACING: CPT

## 2019-11-14 PROCEDURE — 81001 URINALYSIS AUTO W/SCOPE: CPT | Performed by: INTERNAL MEDICINE

## 2019-11-14 PROCEDURE — 87086 URINE CULTURE/COLONY COUNT: CPT

## 2019-11-15 LAB
ATRIAL RATE: 65 BPM
BACTERIA UR CULT: NORMAL
P AXIS: 49 DEGREES
PR INTERVAL: 150 MS
QRS AXIS: 39 DEGREES
QRSD INTERVAL: 86 MS
QT INTERVAL: 396 MS
QTC INTERVAL: 411 MS
T WAVE AXIS: 49 DEGREES
VENTRICULAR RATE: 65 BPM

## 2019-11-15 PROCEDURE — 93010 ELECTROCARDIOGRAM REPORT: CPT | Performed by: INTERNAL MEDICINE

## 2019-11-18 ENCOUNTER — TELEPHONE (OUTPATIENT)
Dept: UROLOGY | Facility: AMBULATORY SURGERY CENTER | Age: 64
End: 2019-11-18

## 2019-11-25 ENCOUNTER — TELEPHONE (OUTPATIENT)
Dept: UROLOGY | Facility: MEDICAL CENTER | Age: 64
End: 2019-11-25

## 2019-11-25 ENCOUNTER — OFFICE VISIT (OUTPATIENT)
Dept: INTERNAL MEDICINE CLINIC | Facility: CLINIC | Age: 64
End: 2019-11-25
Payer: COMMERCIAL

## 2019-11-25 VITALS
HEART RATE: 75 BPM | DIASTOLIC BLOOD PRESSURE: 74 MMHG | SYSTOLIC BLOOD PRESSURE: 130 MMHG | TEMPERATURE: 97.9 F | WEIGHT: 182 LBS | HEIGHT: 68 IN | BODY MASS INDEX: 27.58 KG/M2 | OXYGEN SATURATION: 98 %

## 2019-11-25 DIAGNOSIS — Z12.5 PROSTATE CANCER SCREENING: Primary | ICD-10-CM

## 2019-11-25 DIAGNOSIS — I10 ESSENTIAL HYPERTENSION: ICD-10-CM

## 2019-11-25 DIAGNOSIS — N32.89 BLADDER MASS: ICD-10-CM

## 2019-11-25 DIAGNOSIS — F17.210 CIGARETTE NICOTINE DEPENDENCE WITHOUT COMPLICATION: ICD-10-CM

## 2019-11-25 DIAGNOSIS — J20.8 ACUTE BRONCHITIS DUE TO OTHER SPECIFIED ORGANISMS: ICD-10-CM

## 2019-11-25 PROCEDURE — 99214 OFFICE O/P EST MOD 30 MIN: CPT | Performed by: INTERNAL MEDICINE

## 2019-11-25 RX ORDER — AZITHROMYCIN 250 MG/1
TABLET, FILM COATED ORAL
Qty: 6 TABLET | Refills: 0 | Status: SHIPPED | OUTPATIENT
Start: 2019-11-25 | End: 2019-11-30

## 2019-11-25 RX ORDER — ALBUTEROL SULFATE 90 UG/1
2 AEROSOL, METERED RESPIRATORY (INHALATION) EVERY 6 HOURS PRN
Qty: 1 INHALER | Refills: 2 | Status: ON HOLD | OUTPATIENT
Start: 2019-11-25 | End: 2019-12-11 | Stop reason: ALTCHOICE

## 2019-11-25 RX ORDER — PREDNISONE 1 MG/1
5 TABLET ORAL DAILY
Qty: 21 TABLET | Refills: 0 | Status: SHIPPED | OUTPATIENT
Start: 2019-11-25 | End: 2019-12-02

## 2019-11-25 NOTE — ASSESSMENT & PLAN NOTE
Patient is here today in the office complaining of an upper respiratory tract infection, cough over the past 3 weeks which is not subsiding  Patient states occasionally he is coughing up some clear but not colored sputum  Occasional wheezes  Chest congestion  He feels there is a lot of mucus in the chest and is difficult to get up  He states there is some shortness of breath but not severely so  On evaluation patient does have rhonchi heard both anteriorly and posteriorly which after albuterol updraft treatment did decrease in intensity and especially after cough  He has no swelling of the feet and ankles, heart rate is stable  Patient will be treated aggressively, is secondary to prolonged illness and worried about upcoming procedure with resection of bladder tumor  Patient is placed on azithromycin Z-Conrad to take as directed, tapering dose of prednisone 5 milligrams, given prescription for an albuterol inhaler to use as needed  Patient is told call us with an update later in the week or sooner if not improving or worsening symptoms  Patient states he is no longer smoking and does have a nicotine patch on which she states is helpful with his cravings

## 2019-11-25 NOTE — ASSESSMENT & PLAN NOTE
Patient does have a history of elevated blood pressure readings in the past   As noted patient's blood pressure showing adequate control despite the fact the patient is on no medication  Patient will continue present surveillance

## 2019-11-25 NOTE — ASSESSMENT & PLAN NOTE
Patient states he does have recurrence of his bladder tumor  His going back for resection and evaluation by Urology  States he may be going on chemotherapy postoperatively

## 2019-11-25 NOTE — ASSESSMENT & PLAN NOTE
Again patient is commended on the fact that he is not smoking at this point time and is on a patch for nicotine replacement    We did discuss with the patient the importance of quitting completely especially with toxins that could be concentrating in his urinary bladder

## 2019-11-25 NOTE — TELEPHONE ENCOUNTER
Patient of Dr Maldonado Pearson seen at the [place] office    Call received in regard to the Chemotherapy planned for post op   Please call Sri Vargas @ [facility]  He can be reached at [phone]  X [phone]  Thank you

## 2019-11-26 NOTE — TELEPHONE ENCOUNTER
Called Margaux Garland back and he is faxing over form to be filled out for necessity of Mitomycin to [phone]

## 2019-11-27 ENCOUNTER — TELEPHONE (OUTPATIENT)
Dept: UROLOGY | Facility: MEDICAL CENTER | Age: 64
End: 2019-11-27

## 2019-11-27 NOTE — TELEPHONE ENCOUNTER
Call returned to [facility], advised that per pathology patient is stage T1  Left message  Office number provided on voicemail

## 2019-11-27 NOTE — TELEPHONE ENCOUNTER
Patient of Dr Beth Cronin seen in [place] [facility] is reviewing the chemo workup and they need to know which stage of cancer patient has  Please call [facility] at [phone] ext [phone] or fax to [phone]

## 2019-11-29 ENCOUNTER — ANESTHESIA EVENT (OUTPATIENT)
Dept: PERIOP | Facility: AMBULARY SURGERY CENTER | Age: 64
End: 2019-11-29
Payer: COMMERCIAL

## 2019-12-02 NOTE — PRE-PROCEDURE INSTRUCTIONS
Pre-Surgery Instructions:   Medication Instructions    albuterol (PROAIR HFA) 90 mcg/act inhaler Instructed patient per Anesthesia Guidelines   ibuprofen (MOTRIN) 600 mg tablet Patient was instructed by Physician and understands  Pre op and bathing instructions reviewed

## 2019-12-04 NOTE — TELEPHONE ENCOUNTER
Clemencia Sorensen from [address] is calling to advise that Mitomycin was authorized as medical necessity  Left reference number [id number]  Left disclaimer verbally stated to call claims payer regarding benefits eligibility and how it will be paid     Authorized one dose on 12/11/19

## 2019-12-10 ENCOUNTER — TELEPHONE (OUTPATIENT)
Dept: UROLOGY | Facility: AMBULATORY SURGERY CENTER | Age: 64
End: 2019-12-10

## 2019-12-11 ENCOUNTER — TELEPHONE (OUTPATIENT)
Dept: UROLOGY | Facility: AMBULATORY SURGERY CENTER | Age: 64
End: 2019-12-11

## 2019-12-11 ENCOUNTER — ANESTHESIA (OUTPATIENT)
Dept: PERIOP | Facility: AMBULARY SURGERY CENTER | Age: 64
End: 2019-12-11
Payer: COMMERCIAL

## 2019-12-11 ENCOUNTER — HOSPITAL ENCOUNTER (OUTPATIENT)
Facility: AMBULARY SURGERY CENTER | Age: 64
Setting detail: OUTPATIENT SURGERY
Discharge: HOME | End: 2019-12-11
Attending: UROLOGY | Admitting: UROLOGY
Payer: COMMERCIAL

## 2019-12-11 ENCOUNTER — APPOINTMENT (OUTPATIENT)
Dept: RADIOLOGY | Facility: AMBULARY SURGERY CENTER | Age: 64
End: 2019-12-11
Payer: COMMERCIAL

## 2019-12-11 VITALS
OXYGEN SATURATION: 93 % | SYSTOLIC BLOOD PRESSURE: 142 MMHG | HEIGHT: 68 IN | DIASTOLIC BLOOD PRESSURE: 60 MMHG | HEART RATE: 68 BPM | TEMPERATURE: 97.3 F | BODY MASS INDEX: 26.83 KG/M2 | WEIGHT: 177 LBS | RESPIRATION RATE: 16 BRPM

## 2019-12-11 DIAGNOSIS — C67.5 MALIGNANT NEOPLASM OF URINARY BLADDER NECK (HCC): Primary | ICD-10-CM

## 2019-12-11 DIAGNOSIS — C67.9 MALIGNANT NEOPLASM OF URINARY BLADDER, UNSPECIFIED SITE (HCC): ICD-10-CM

## 2019-12-11 PROCEDURE — C1769 GUIDE WIRE: HCPCS | Performed by: UROLOGY

## 2019-12-11 PROCEDURE — 88307 TISSUE EXAM BY PATHOLOGIST: CPT | Performed by: PATHOLOGY

## 2019-12-11 PROCEDURE — 52235 CYSTOSCOPY AND TREATMENT: CPT | Performed by: UROLOGY

## 2019-12-11 PROCEDURE — NC001 PR NO CHARGE: Performed by: UROLOGY

## 2019-12-11 RX ORDER — PROPOFOL 10 MG/ML
INJECTION, EMULSION INTRAVENOUS AS NEEDED
Status: DISCONTINUED | OUTPATIENT
Start: 2019-12-11 | End: 2019-12-11 | Stop reason: SURG

## 2019-12-11 RX ORDER — CEFAZOLIN SODIUM 1 G/50ML
1000 SOLUTION INTRAVENOUS ONCE
Status: COMPLETED | OUTPATIENT
Start: 2019-12-11 | End: 2019-12-11

## 2019-12-11 RX ORDER — FENTANYL CITRATE 50 UG/ML
INJECTION, SOLUTION INTRAMUSCULAR; INTRAVENOUS AS NEEDED
Status: DISCONTINUED | OUTPATIENT
Start: 2019-12-11 | End: 2019-12-11 | Stop reason: SURG

## 2019-12-11 RX ORDER — DEXAMETHASONE SODIUM PHOSPHATE 10 MG/ML
INJECTION, SOLUTION INTRAMUSCULAR; INTRAVENOUS AS NEEDED
Status: DISCONTINUED | OUTPATIENT
Start: 2019-12-11 | End: 2019-12-11 | Stop reason: SURG

## 2019-12-11 RX ORDER — FENTANYL CITRATE/PF 50 MCG/ML
25 SYRINGE (ML) INJECTION
Status: DISCONTINUED | OUTPATIENT
Start: 2019-12-11 | End: 2019-12-11 | Stop reason: HOSPADM

## 2019-12-11 RX ORDER — MAGNESIUM HYDROXIDE 1200 MG/15ML
LIQUID ORAL AS NEEDED
Status: DISCONTINUED | OUTPATIENT
Start: 2019-12-11 | End: 2019-12-11 | Stop reason: HOSPADM

## 2019-12-11 RX ORDER — CEPHALEXIN 500 MG/1
500 CAPSULE ORAL EVERY 12 HOURS SCHEDULED
Qty: 6 CAPSULE | Refills: 0 | Status: SHIPPED | OUTPATIENT
Start: 2019-12-11 | End: 2019-12-14

## 2019-12-11 RX ORDER — LIDOCAINE HYDROCHLORIDE 10 MG/ML
INJECTION, SOLUTION EPIDURAL; INFILTRATION; INTRACAUDAL; PERINEURAL AS NEEDED
Status: DISCONTINUED | OUTPATIENT
Start: 2019-12-11 | End: 2019-12-11 | Stop reason: SURG

## 2019-12-11 RX ORDER — SODIUM CHLORIDE, SODIUM LACTATE, POTASSIUM CHLORIDE, CALCIUM CHLORIDE 600; 310; 30; 20 MG/100ML; MG/100ML; MG/100ML; MG/100ML
125 INJECTION, SOLUTION INTRAVENOUS CONTINUOUS
Status: DISCONTINUED | OUTPATIENT
Start: 2019-12-11 | End: 2019-12-11 | Stop reason: HOSPADM

## 2019-12-11 RX ORDER — OXYCODONE HYDROCHLORIDE AND ACETAMINOPHEN 5; 325 MG/1; MG/1
1 TABLET ORAL EVERY 4 HOURS PRN
Qty: 5 TABLET | Refills: 0 | Status: SHIPPED | OUTPATIENT
Start: 2019-12-11 | End: 2019-12-13

## 2019-12-11 RX ORDER — ONDANSETRON 2 MG/ML
INJECTION INTRAMUSCULAR; INTRAVENOUS AS NEEDED
Status: DISCONTINUED | OUTPATIENT
Start: 2019-12-11 | End: 2019-12-11 | Stop reason: SURG

## 2019-12-11 RX ORDER — ONDANSETRON 2 MG/ML
4 INJECTION INTRAMUSCULAR; INTRAVENOUS ONCE AS NEEDED
Status: DISCONTINUED | OUTPATIENT
Start: 2019-12-11 | End: 2019-12-11 | Stop reason: HOSPADM

## 2019-12-11 RX ADMIN — DEXAMETHASONE SODIUM PHOSPHATE 4 MG: 10 INJECTION, SOLUTION INTRAMUSCULAR; INTRAVENOUS at 12:06

## 2019-12-11 RX ADMIN — SODIUM CHLORIDE, SODIUM LACTATE, POTASSIUM CHLORIDE, AND CALCIUM CHLORIDE 125 ML/HR: .6; .31; .03; .02 INJECTION, SOLUTION INTRAVENOUS at 11:09

## 2019-12-11 RX ADMIN — PROPOFOL 200 MG: 10 INJECTION, EMULSION INTRAVENOUS at 12:01

## 2019-12-11 RX ADMIN — FENTANYL CITRATE 25 MCG: 50 INJECTION, SOLUTION INTRAMUSCULAR; INTRAVENOUS at 13:23

## 2019-12-11 RX ADMIN — FENTANYL CITRATE 50 MCG: 50 INJECTION, SOLUTION INTRAMUSCULAR; INTRAVENOUS at 12:26

## 2019-12-11 RX ADMIN — LIDOCAINE HYDROCHLORIDE 50 MG: 10 INJECTION, SOLUTION EPIDURAL; INFILTRATION; INTRACAUDAL; PERINEURAL at 12:01

## 2019-12-11 RX ADMIN — FENTANYL CITRATE 25 MCG: 50 INJECTION, SOLUTION INTRAMUSCULAR; INTRAVENOUS at 13:28

## 2019-12-11 RX ADMIN — FENTANYL CITRATE 50 MCG: 50 INJECTION, SOLUTION INTRAMUSCULAR; INTRAVENOUS at 12:14

## 2019-12-11 RX ADMIN — FENTANYL CITRATE 25 MCG: 50 INJECTION, SOLUTION INTRAMUSCULAR; INTRAVENOUS at 13:34

## 2019-12-11 RX ADMIN — ONDANSETRON 4 MG: 2 INJECTION INTRAMUSCULAR; INTRAVENOUS at 12:06

## 2019-12-11 RX ADMIN — CEFAZOLIN SODIUM 1000 MG: 1 SOLUTION INTRAVENOUS at 11:56

## 2019-12-11 NOTE — ANESTHESIA POSTPROCEDURE EVALUATION
Post-Op Assessment Note    CV Status:  Stable  Pain Score: 0    Pain management: adequate     Mental Status:  Alert and awake   Hydration Status:  Euvolemic   PONV Controlled:  Controlled   Airway Patency:  Patent   Post Op Vitals Reviewed: Yes      Staff: CRNA           BP   145/79   Temp  98 1   Pulse  67   Resp   16   SpO2   93 2l

## 2019-12-11 NOTE — OP NOTE
OPERATIVE REPORT  PATIENT NAME: Alicia Molina    :  1955  MRN: [id number]  Pt Location: [facility]    SURGERY DATE: 2019    Surgeon(s) and Role:     Janine Bonner MD - Primary    Preop Diagnosis:  Malignant neoplasm of urinary bladder, unspecified site ([facility] [address] ) [C67 9]    Post-Op Diagnosis Codes:     * Malignant neoplasm of urinary bladder, unspecified site ([facility] [address] ) [C67 9]    Procedure(s) (LRB):  TRANSURETHRAL RESECTION OF BLADDER TUMOR (TURBT) (N/A)  URETEROSCOPY (Left)  INSTILLATION MITOMYCIN (N/A)    Specimen(s):  ID Type Source Tests Collected by Time Destination   1 : bladder neck tumor Tissue Urinary Bladder TISSUE EXAM Becky Brown MD 2019 1224        Estimated Blood Loss:   Minimal    Drains:  Urethral Catheter Latex 18 Fr  (Active)   Number of days: 0       Ureteral Drain/Stent Left ureter 6 Fr  (Active)   Number of days: 392       Anesthesia Type:   General    Operative Indications:  Malignant neoplasm of urinary bladder, unspecified site [facility]) [C67 9]      Operative Findings:  Recurrent urothelial carcinoma on the left bladder neck  Patent left ureteral orifice status post resection  Normal distal to mid left ureter without suspicious lesion  Complications:   None    Procedure and Technique:  Patient was identified, brought to operating, placed on table in supine position  After induction general anesthesia he was placed in dorsal lithotomy position and prepped and draped in usual sterile fashion  A formal time-out was performed  The 25 Armenian rigid cystoscope was introduced per urethra into the bladder and cystoscopy was performed  Abnormal mass measuring 2 cm was identified at the left bladder neck as seen previously  Careful cystoscopy was performed with 30 degree and 70 degree lenses  There is no additional synchronous mass noted  The semi rigid ureteral scope was then placed and left ureteroscopy was performed    There is no suspicious lesion noted within the distal or mid left ureter  The scope was then removed  The resectoscope was then placed with visual obturator  Calibration of the urethra was necessary up to 30 [address] L[address]  Trans urethral resection of the tumor was performed with loop electrocautery until completely gone  Hemostasis was achieved with loop electrocautery  Care was taken not to injure the ureteral orifices  All specimens were retrieved  At the conclusion there was good hemostasis  Eighteen Western Lili Toscano catheter was placed and mitomycin 40 mg/40 mL was instilled     I was present for the entire procedure    Patient Disposition:  PACU     SIGNATURE: Jenn Ibarra MD  DATE: December 11, 2019  TIME: 1:10 PM

## 2019-12-11 NOTE — ANESTHESIA PREPROCEDURE EVALUATION
Review of Systems/Medical History  Patient summary reviewed  Chart reviewed  No history of anesthetic complications     Cardiovascular  EKG reviewed, Hypertension ,    Pulmonary  Smoker cigarette smoker  , Tobacco cessation counseling given ,        GI/Hepatic    Liver disease , Hepatitis C,        Genitourinary malignancy (bladder mass) Bladder cancer,        Endo/Other     GYN       Hematology   Musculoskeletal       Neurology   Psychology   Anxiety,              Physical Exam    Airway    Mallampati score: II  TM Distance: >3 FB  Neck ROM: full     Dental   No notable dental hx     Cardiovascular  Cardiovascular exam normal    Pulmonary  Pulmonary exam normal     Other Findings        Anesthesia Plan  ASA Score- 3     Anesthesia Type- general with ASA Monitors  Additional Monitors:   Airway Plan: LMA  Plan Factors-    Induction- intravenous  Postoperative Plan- Plan for postoperative opioid use  Informed Consent- Anesthetic plan and risks discussed with patient  I personally reviewed this patient with the CRNA  Discussed and agreed on the Anesthesia Plan with the CRNA  Rafa Whiteside
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
## 2019-12-12 ENCOUNTER — TELEPHONE (OUTPATIENT)
Dept: UROLOGY | Facility: AMBULATORY SURGERY CENTER | Age: 64
End: 2019-12-12

## 2019-12-19 NOTE — TELEPHONE ENCOUNTER
Patient wife called in and would like to know the results just so she can understand as well  Please advise

## 2019-12-19 NOTE — TELEPHONE ENCOUNTER
Call placed to patient, advised of pathology  Patient had no questions   Scheduled for 3 month surveillance cystoscopy in office with Dr Guss Galeazzi

## 2019-12-19 NOTE — TELEPHONE ENCOUNTER
Patient notify the pathology reveals low-grade superficial urothelial carcinoma recurrence  Muscle was negative for invasion  He should follow up for routine office cystoscopy in 3 months

## 2020-01-21 ENCOUNTER — APPOINTMENT (OUTPATIENT)
Dept: LAB | Age: 65
End: 2020-01-21
Payer: COMMERCIAL

## 2020-01-21 ENCOUNTER — TELEPHONE (OUTPATIENT)
Dept: UROLOGY | Facility: MEDICAL CENTER | Age: 65
End: 2020-01-21

## 2020-01-21 DIAGNOSIS — R35.0 URINARY FREQUENCY: Primary | ICD-10-CM

## 2020-01-21 DIAGNOSIS — R35.0 URINARY FREQUENCY: ICD-10-CM

## 2020-01-21 LAB
BACTERIA UR QL AUTO: ABNORMAL /HPF
BILIRUB UR QL STRIP: NEGATIVE
CLARITY UR: CLEAR
COLOR UR: YELLOW
GLUCOSE UR STRIP-MCNC: NEGATIVE MG/DL
HGB UR QL STRIP.AUTO: NEGATIVE
HYALINE CASTS #/AREA URNS LPF: ABNORMAL /LPF
KETONES UR STRIP-MCNC: NEGATIVE MG/DL
LEUKOCYTE ESTERASE UR QL STRIP: NEGATIVE
NITRITE UR QL STRIP: NEGATIVE
NON-SQ EPI CELLS URNS QL MICRO: ABNORMAL /HPF
PH UR STRIP.AUTO: 6.5 [PH]
PROT UR STRIP-MCNC: NEGATIVE MG/DL
RBC #/AREA URNS AUTO: ABNORMAL /HPF
SP GR UR STRIP.AUTO: 1.01 (ref 1–1.03)
UROBILINOGEN UR QL STRIP.AUTO: 0.2 E.U./DL
WBC #/AREA URNS AUTO: ABNORMAL /HPF

## 2020-01-21 PROCEDURE — 81001 URINALYSIS AUTO W/SCOPE: CPT

## 2020-01-21 PROCEDURE — 87086 URINE CULTURE/COLONY COUNT: CPT

## 2020-01-21 NOTE — TELEPHONE ENCOUNTER
Patient of [facility] office  History bladder cancer, s/p TRANSURETHRAL RESECTION OF BLADDER TUMOR (TURBT), URETEROSCOPY (Left)  INSTILLATION MITOMYCIN 12/11/2019  Patient calling today with continued complaints for burning with urination, frequency  Orders placed for UA C&S to rule out urinary tract infection  Office will follow up as results become available usually within 48-72 hours  Patient encouraged to hydrate well with water and avoid bladder irritants  Patient instructed to call back with worsening symptoms, fever, chills, blood in the urine or difficulty urinating

## 2020-01-21 NOTE — TELEPHONE ENCOUNTER
Patient of Dr Faiza Heredia seen in [facility]  Patient called stating that he had a procedure on 12/11/19, and he is still experiencing burning when urinating, and has frequent urination  He can be reached at [phone]

## 2020-01-22 LAB — BACTERIA UR CULT: NORMAL

## 2020-01-22 NOTE — TELEPHONE ENCOUNTER
Called and spoke with patient, aware urine testing came back negative for infection  Patient would like to know what he can take for the discomfort  Advised he may go to pharmacy and  AZO to help ease the pain  Patient will go pick that up today and begin taking  All questions answered at this time

## 2020-03-02 ENCOUNTER — TELEPHONE (OUTPATIENT)
Dept: UROLOGY | Facility: AMBULATORY SURGERY CENTER | Age: 65
End: 2020-03-02

## 2020-03-02 DIAGNOSIS — R30.0 BURNING WITH URINATION: Primary | ICD-10-CM

## 2020-03-02 NOTE — TELEPHONE ENCOUNTER
Patient of [facility] [facility]  History bladder cancer, s/p TRANSURETHRAL RESECTION OF BLADDER TUMOR (TURBT)   URETEROSCOPY (Left), INSTILLATION MITOMYCIN  Patient called with complaints of blood in urine this morning, as well as burning with urination, frequency, urgency  Denies fever, chills  Orders placed for UA C&S to rule out urinary tract infection  Office will follow up as results become available usually within 48-72 hours  Patient encouraged to hydrate well with water and avoid bladder irritants  Patient instructed to call back with worsening symptoms, fever, chills, blood in the urine or difficulty urinating  Advised patient that will route concern to provider to advise if any additional recommendations and will call back if any  Patient verbalized understanding and agrees with plan

## 2020-04-20 ENCOUNTER — TELEMEDICINE (OUTPATIENT)
Dept: INTERNAL MEDICINE CLINIC | Facility: CLINIC | Age: 65
End: 2020-04-20
Payer: COMMERCIAL

## 2020-04-20 DIAGNOSIS — J20.8 ACUTE BRONCHITIS DUE TO OTHER SPECIFIED ORGANISMS: Primary | ICD-10-CM

## 2020-04-20 DIAGNOSIS — U07.1 COVID-19: ICD-10-CM

## 2020-04-20 PROCEDURE — 99212 OFFICE O/P EST SF 10 MIN: CPT | Performed by: INTERNAL MEDICINE

## 2020-04-20 RX ORDER — ALBUTEROL SULFATE 90 UG/1
2 AEROSOL, METERED RESPIRATORY (INHALATION) EVERY 6 HOURS PRN
Qty: 1 INHALER | Refills: 5 | Status: SHIPPED | OUTPATIENT
Start: 2020-04-20 | End: 2020-06-03

## 2020-04-22 ENCOUNTER — TELEMEDICINE (OUTPATIENT)
Dept: INTERNAL MEDICINE CLINIC | Facility: CLINIC | Age: 65
End: 2020-04-22
Payer: COMMERCIAL

## 2020-04-22 VITALS — TEMPERATURE: 98.6 F

## 2020-04-22 DIAGNOSIS — U07.1 COVID-19: Primary | ICD-10-CM

## 2020-04-22 PROCEDURE — 99213 OFFICE O/P EST LOW 20 MIN: CPT | Performed by: INTERNAL MEDICINE

## 2020-04-24 ENCOUNTER — TELEMEDICINE (OUTPATIENT)
Dept: INTERNAL MEDICINE CLINIC | Facility: CLINIC | Age: 65
End: 2020-04-24
Payer: COMMERCIAL

## 2020-04-24 DIAGNOSIS — U07.1 COVID-19: Primary | ICD-10-CM

## 2020-04-24 PROCEDURE — 99213 OFFICE O/P EST LOW 20 MIN: CPT | Performed by: INTERNAL MEDICINE

## 2020-04-27 ENCOUNTER — TELEMEDICINE (OUTPATIENT)
Dept: INTERNAL MEDICINE CLINIC | Facility: CLINIC | Age: 65
End: 2020-04-27
Payer: COMMERCIAL

## 2020-04-27 DIAGNOSIS — U07.1 COVID-19: Primary | ICD-10-CM

## 2020-04-27 PROCEDURE — 99213 OFFICE O/P EST LOW 20 MIN: CPT | Performed by: INTERNAL MEDICINE

## 2020-04-29 ENCOUNTER — TELEMEDICINE (OUTPATIENT)
Dept: INTERNAL MEDICINE CLINIC | Facility: CLINIC | Age: 65
End: 2020-04-29
Payer: COMMERCIAL

## 2020-04-29 DIAGNOSIS — U07.1 COVID-19: Primary | ICD-10-CM

## 2020-04-29 PROCEDURE — 99213 OFFICE O/P EST LOW 20 MIN: CPT | Performed by: INTERNAL MEDICINE

## 2020-05-14 DIAGNOSIS — R35.0 URINARY FREQUENCY: ICD-10-CM

## 2020-05-14 DIAGNOSIS — R30.0 BURNING WITH URINATION: ICD-10-CM

## 2020-05-14 DIAGNOSIS — C67.9 MALIGNANT NEOPLASM OF URINARY BLADDER, UNSPECIFIED SITE (HCC): Primary | ICD-10-CM

## 2020-05-20 ENCOUNTER — TELEPHONE (OUTPATIENT)
Dept: UROLOGY | Facility: AMBULATORY SURGERY CENTER | Age: 65
End: 2020-05-20

## 2020-05-22 ENCOUNTER — PROCEDURE VISIT (OUTPATIENT)
Dept: UROLOGY | Facility: AMBULATORY SURGERY CENTER | Age: 65
End: 2020-05-22
Payer: COMMERCIAL

## 2020-05-22 VITALS
HEIGHT: 68 IN | WEIGHT: 174 LBS | HEART RATE: 70 BPM | BODY MASS INDEX: 26.37 KG/M2 | SYSTOLIC BLOOD PRESSURE: 142 MMHG | DIASTOLIC BLOOD PRESSURE: 72 MMHG

## 2020-05-22 DIAGNOSIS — R35.0 URINARY FREQUENCY: ICD-10-CM

## 2020-05-22 DIAGNOSIS — C67.9 MALIGNANT NEOPLASM OF URINARY BLADDER, UNSPECIFIED SITE (HCC): Primary | ICD-10-CM

## 2020-05-22 LAB
SL AMB  POCT GLUCOSE, UA: 2000
SL AMB LEUKOCYTE ESTERASE,UA: NORMAL
SL AMB POCT BILIRUBIN,UA: NORMAL
SL AMB POCT BLOOD,UA: NORMAL
SL AMB POCT CLARITY,UA: CLEAR
SL AMB POCT COLOR,UA: YELLOW
SL AMB POCT KETONES,UA: NORMAL
SL AMB POCT NITRITE,UA: NORMAL
SL AMB POCT PH,UA: 6
SL AMB POCT SPECIFIC GRAVITY,UA: 1.02
SL AMB POCT URINE PROTEIN: NORMAL
SL AMB POCT UROBILINOGEN: 0.2

## 2020-05-22 PROCEDURE — 99214 OFFICE O/P EST MOD 30 MIN: CPT | Performed by: UROLOGY

## 2020-05-22 PROCEDURE — 88112 CYTOPATH CELL ENHANCE TECH: CPT | Performed by: PATHOLOGY

## 2020-05-22 PROCEDURE — 3077F SYST BP >= 140 MM HG: CPT | Performed by: UROLOGY

## 2020-05-22 PROCEDURE — 3008F BODY MASS INDEX DOCD: CPT | Performed by: UROLOGY

## 2020-05-22 PROCEDURE — 4004F PT TOBACCO SCREEN RCVD TLK: CPT | Performed by: UROLOGY

## 2020-05-22 PROCEDURE — 52000 CYSTOURETHROSCOPY: CPT | Performed by: UROLOGY

## 2020-05-22 PROCEDURE — 3078F DIAST BP <80 MM HG: CPT | Performed by: UROLOGY

## 2020-05-22 PROCEDURE — 81002 URINALYSIS NONAUTO W/O SCOPE: CPT | Performed by: UROLOGY

## 2020-05-22 RX ORDER — CEFAZOLIN SODIUM 1 G/50ML
1000 SOLUTION INTRAVENOUS ONCE
Status: CANCELLED | OUTPATIENT
Start: 2020-05-22 | End: 2020-05-22

## 2020-05-26 ENCOUNTER — APPOINTMENT (OUTPATIENT)
Dept: LAB | Age: 65
End: 2020-05-26
Payer: COMMERCIAL

## 2020-05-26 ENCOUNTER — TELEPHONE (OUTPATIENT)
Dept: UROLOGY | Facility: AMBULATORY SURGERY CENTER | Age: 65
End: 2020-05-26

## 2020-05-26 DIAGNOSIS — C67.9 MALIGNANT NEOPLASM OF URINARY BLADDER, UNSPECIFIED SITE (HCC): ICD-10-CM

## 2020-05-26 PROCEDURE — 87086 URINE CULTURE/COLONY COUNT: CPT

## 2020-05-27 ENCOUNTER — ANESTHESIA EVENT (OUTPATIENT)
Dept: PERIOP | Facility: HOSPITAL | Age: 65
End: 2020-05-27
Payer: COMMERCIAL

## 2020-05-27 LAB — BACTERIA UR CULT: ABNORMAL

## 2020-05-29 ENCOUNTER — OFFICE VISIT (OUTPATIENT)
Dept: LAB | Age: 65
End: 2020-05-29
Payer: COMMERCIAL

## 2020-05-29 ENCOUNTER — TRANSCRIBE ORDERS (OUTPATIENT)
Dept: ADMINISTRATIVE | Age: 65
End: 2020-05-29

## 2020-05-29 LAB
ATRIAL RATE: 66 BPM
P AXIS: 56 DEGREES
PR INTERVAL: 144 MS
QRS AXIS: 48 DEGREES
QRSD INTERVAL: 88 MS
QT INTERVAL: 416 MS
QTC INTERVAL: 436 MS
T WAVE AXIS: 48 DEGREES
VENTRICULAR RATE: 66 BPM

## 2020-05-29 PROCEDURE — 93005 ELECTROCARDIOGRAM TRACING: CPT

## 2020-05-29 PROCEDURE — 93010 ELECTROCARDIOGRAM REPORT: CPT | Performed by: INTERNAL MEDICINE

## 2020-06-02 ENCOUNTER — TELEPHONE (OUTPATIENT)
Dept: UROLOGY | Facility: AMBULATORY SURGERY CENTER | Age: 65
End: 2020-06-02

## 2020-06-03 ENCOUNTER — DOCUMENTATION (OUTPATIENT)
Dept: URGENT CARE | Age: 65
End: 2020-06-03

## 2020-06-03 DIAGNOSIS — C67.9 MALIGNANT NEOPLASM OF URINARY BLADDER, UNSPECIFIED SITE (HCC): ICD-10-CM

## 2020-06-03 PROCEDURE — U0003 INFECTIOUS AGENT DETECTION BY NUCLEIC ACID (DNA OR RNA); SEVERE ACUTE RESPIRATORY SYNDROME CORONAVIRUS 2 (SARS-COV-2) (CORONAVIRUS DISEASE [COVID-19]), AMPLIFIED PROBE TECHNIQUE, MAKING USE OF HIGH THROUGHPUT TECHNOLOGIES AS DESCRIBED BY CMS-2020-01-R: HCPCS

## 2020-06-05 LAB — SARS-COV-2 RNA SPEC QL NAA+PROBE: NOT DETECTED

## 2020-06-08 ENCOUNTER — TELEPHONE (OUTPATIENT)
Dept: UROLOGY | Facility: MEDICAL CENTER | Age: 65
End: 2020-06-08

## 2020-06-09 ENCOUNTER — TELEPHONE (OUTPATIENT)
Dept: UROLOGY | Facility: AMBULATORY SURGERY CENTER | Age: 65
End: 2020-06-09

## 2020-06-09 ENCOUNTER — HOSPITAL ENCOUNTER (OUTPATIENT)
Facility: HOSPITAL | Age: 65
Setting detail: OUTPATIENT SURGERY
Discharge: HOME | End: 2020-06-09
Attending: UROLOGY | Admitting: UROLOGY
Payer: COMMERCIAL

## 2020-06-09 VITALS
TEMPERATURE: 97.8 F | DIASTOLIC BLOOD PRESSURE: 70 MMHG | BODY MASS INDEX: 26.37 KG/M2 | HEART RATE: 60 BPM | HEIGHT: 68 IN | RESPIRATION RATE: 16 BRPM | WEIGHT: 174 LBS | SYSTOLIC BLOOD PRESSURE: 168 MMHG | OXYGEN SATURATION: 98 %

## 2020-06-09 DIAGNOSIS — C67.9 UROTHELIAL CARCINOMA OF BLADDER (HCC): Primary | ICD-10-CM

## 2020-06-09 DIAGNOSIS — C67.9 MALIGNANT NEOPLASM OF URINARY BLADDER, UNSPECIFIED SITE (HCC): ICD-10-CM

## 2020-06-09 PROCEDURE — 88305 TISSUE EXAM BY PATHOLOGIST: CPT | Performed by: PATHOLOGY

## 2020-06-09 PROCEDURE — 52234 CYSTOSCOPY AND TREATMENT: CPT | Performed by: UROLOGY

## 2020-06-09 RX ORDER — LIDOCAINE HYDROCHLORIDE 10 MG/ML
INJECTION, SOLUTION EPIDURAL; INFILTRATION; INTRACAUDAL; PERINEURAL AS NEEDED
Status: DISCONTINUED | OUTPATIENT
Start: 2020-06-09 | End: 2020-06-09 | Stop reason: SURG

## 2020-06-09 RX ORDER — PROPOFOL 10 MG/ML
INJECTION, EMULSION INTRAVENOUS AS NEEDED
Status: DISCONTINUED | OUTPATIENT
Start: 2020-06-09 | End: 2020-06-09 | Stop reason: SURG

## 2020-06-09 RX ORDER — KETOROLAC TROMETHAMINE 30 MG/ML
INJECTION, SOLUTION INTRAMUSCULAR; INTRAVENOUS AS NEEDED
Status: DISCONTINUED | OUTPATIENT
Start: 2020-06-09 | End: 2020-06-09 | Stop reason: SURG

## 2020-06-09 RX ORDER — CEFAZOLIN SODIUM 1 G/50ML
SOLUTION INTRAVENOUS AS NEEDED
Status: DISCONTINUED | OUTPATIENT
Start: 2020-06-09 | End: 2020-06-09 | Stop reason: SURG

## 2020-06-09 RX ORDER — ONDANSETRON 2 MG/ML
INJECTION INTRAMUSCULAR; INTRAVENOUS AS NEEDED
Status: DISCONTINUED | OUTPATIENT
Start: 2020-06-09 | End: 2020-06-09 | Stop reason: SURG

## 2020-06-09 RX ORDER — DEXAMETHASONE SODIUM PHOSPHATE 10 MG/ML
INJECTION, SOLUTION INTRAMUSCULAR; INTRAVENOUS AS NEEDED
Status: DISCONTINUED | OUTPATIENT
Start: 2020-06-09 | End: 2020-06-09 | Stop reason: SURG

## 2020-06-09 RX ORDER — MAGNESIUM HYDROXIDE 1200 MG/15ML
LIQUID ORAL AS NEEDED
Status: DISCONTINUED | OUTPATIENT
Start: 2020-06-09 | End: 2020-06-09 | Stop reason: HOSPADM

## 2020-06-09 RX ORDER — CEFAZOLIN SODIUM 1 G/50ML
1000 SOLUTION INTRAVENOUS ONCE
Status: DISCONTINUED | OUTPATIENT
Start: 2020-06-09 | End: 2020-06-09 | Stop reason: HOSPADM

## 2020-06-09 RX ORDER — CEPHALEXIN 500 MG/1
500 CAPSULE ORAL EVERY 12 HOURS SCHEDULED
Qty: 6 CAPSULE | Refills: 0 | Status: SHIPPED | OUTPATIENT
Start: 2020-06-09 | End: 2020-06-12

## 2020-06-09 RX ORDER — MIDAZOLAM HYDROCHLORIDE 2 MG/2ML
INJECTION, SOLUTION INTRAMUSCULAR; INTRAVENOUS AS NEEDED
Status: DISCONTINUED | OUTPATIENT
Start: 2020-06-09 | End: 2020-06-09 | Stop reason: SURG

## 2020-06-09 RX ORDER — SODIUM CHLORIDE, SODIUM LACTATE, POTASSIUM CHLORIDE, CALCIUM CHLORIDE 600; 310; 30; 20 MG/100ML; MG/100ML; MG/100ML; MG/100ML
125 INJECTION, SOLUTION INTRAVENOUS CONTINUOUS
Status: DISCONTINUED | OUTPATIENT
Start: 2020-06-09 | End: 2020-06-09 | Stop reason: HOSPADM

## 2020-06-09 RX ORDER — FENTANYL CITRATE/PF 50 MCG/ML
25 SYRINGE (ML) INJECTION
Status: DISCONTINUED | OUTPATIENT
Start: 2020-06-09 | End: 2020-06-09 | Stop reason: HOSPADM

## 2020-06-09 RX ORDER — ONDANSETRON 2 MG/ML
4 INJECTION INTRAMUSCULAR; INTRAVENOUS ONCE AS NEEDED
Status: DISCONTINUED | OUTPATIENT
Start: 2020-06-09 | End: 2020-06-09 | Stop reason: HOSPADM

## 2020-06-09 RX ORDER — FENTANYL CITRATE 50 UG/ML
INJECTION, SOLUTION INTRAMUSCULAR; INTRAVENOUS AS NEEDED
Status: DISCONTINUED | OUTPATIENT
Start: 2020-06-09 | End: 2020-06-09 | Stop reason: SURG

## 2020-06-09 RX ADMIN — KETOROLAC TROMETHAMINE 15 MG: 30 INJECTION, SOLUTION INTRAMUSCULAR at 13:43

## 2020-06-09 RX ADMIN — CEFAZOLIN SODIUM 1000 MG: 1 SOLUTION INTRAVENOUS at 13:10

## 2020-06-09 RX ADMIN — LIDOCAINE HYDROCHLORIDE 50 MG: 10 INJECTION, SOLUTION EPIDURAL; INFILTRATION; INTRACAUDAL; PERINEURAL at 13:15

## 2020-06-09 RX ADMIN — ONDANSETRON 4 MG: 2 INJECTION INTRAMUSCULAR; INTRAVENOUS at 13:20

## 2020-06-09 RX ADMIN — PROPOFOL 200 MG: 10 INJECTION, EMULSION INTRAVENOUS at 13:15

## 2020-06-09 RX ADMIN — FENTANYL CITRATE 50 MCG: 50 INJECTION, SOLUTION INTRAMUSCULAR; INTRAVENOUS at 13:35

## 2020-06-09 RX ADMIN — SODIUM CHLORIDE, SODIUM LACTATE, POTASSIUM CHLORIDE, AND CALCIUM CHLORIDE 125 ML/HR: .6; .31; .03; .02 INJECTION, SOLUTION INTRAVENOUS at 12:20

## 2020-06-09 RX ADMIN — MIDAZOLAM 2 MG: 1 INJECTION INTRAMUSCULAR; INTRAVENOUS at 13:09

## 2020-06-09 RX ADMIN — DEXAMETHASONE SODIUM PHOSPHATE 5 MG: 10 INJECTION, SOLUTION INTRAMUSCULAR; INTRAVENOUS at 13:20

## 2020-06-09 RX ADMIN — FENTANYL CITRATE 50 MCG: 50 INJECTION, SOLUTION INTRAMUSCULAR; INTRAVENOUS at 13:17

## 2020-06-10 ENCOUNTER — ANESTHESIA (OUTPATIENT)
Dept: PERIOP | Facility: HOSPITAL | Age: 65
End: 2020-06-10
Payer: COMMERCIAL

## 2020-06-11 ENCOUNTER — TELEPHONE (OUTPATIENT)
Dept: UROLOGY | Facility: AMBULATORY SURGERY CENTER | Age: 65
End: 2020-06-11

## 2020-06-17 ENCOUNTER — TELEPHONE (OUTPATIENT)
Dept: UROLOGY | Facility: AMBULATORY SURGERY CENTER | Age: 65
End: 2020-06-17

## 2020-06-26 ENCOUNTER — OFFICE VISIT (OUTPATIENT)
Dept: UROLOGY | Facility: AMBULATORY SURGERY CENTER | Age: 65
End: 2020-06-26
Payer: COMMERCIAL

## 2020-06-26 VITALS
HEART RATE: 67 BPM | WEIGHT: 179 LBS | BODY MASS INDEX: 27.13 KG/M2 | DIASTOLIC BLOOD PRESSURE: 65 MMHG | TEMPERATURE: 98.7 F | HEIGHT: 68 IN | SYSTOLIC BLOOD PRESSURE: 132 MMHG

## 2020-06-26 DIAGNOSIS — C67.9 UROTHELIAL CARCINOMA OF BLADDER (HCC): Primary | ICD-10-CM

## 2020-06-26 PROCEDURE — 3078F DIAST BP <80 MM HG: CPT | Performed by: UROLOGY

## 2020-06-26 PROCEDURE — 3008F BODY MASS INDEX DOCD: CPT | Performed by: UROLOGY

## 2020-06-26 PROCEDURE — 3075F SYST BP GE 130 - 139MM HG: CPT | Performed by: UROLOGY

## 2020-06-26 PROCEDURE — 99214 OFFICE O/P EST MOD 30 MIN: CPT | Performed by: UROLOGY

## 2020-06-26 PROCEDURE — 1036F TOBACCO NON-USER: CPT | Performed by: UROLOGY

## 2020-07-03 ENCOUNTER — APPOINTMENT (OUTPATIENT)
Dept: LAB | Age: 65
End: 2020-07-03
Payer: COMMERCIAL

## 2020-07-03 DIAGNOSIS — C67.9 UROTHELIAL CARCINOMA OF BLADDER (HCC): ICD-10-CM

## 2020-07-03 LAB
ANION GAP SERPL CALCULATED.3IONS-SCNC: 6 MMOL/L (ref 4–13)
BUN SERPL-MCNC: 11 MG/DL (ref 5–25)
CALCIUM SERPL-MCNC: 9.4 MG/DL (ref 8.3–10.1)
CHLORIDE SERPL-SCNC: 108 MMOL/L (ref 100–108)
CO2 SERPL-SCNC: 24 MMOL/L (ref 21–32)
CREAT SERPL-MCNC: 0.95 MG/DL (ref 0.6–1.3)
GFR SERPL CREATININE-BSD FRML MDRD: 85 ML/MIN/1.73SQ M
GLUCOSE SERPL-MCNC: 286 MG/DL (ref 65–140)
POTASSIUM SERPL-SCNC: 4 MMOL/L (ref 3.5–5.3)
SODIUM SERPL-SCNC: 138 MMOL/L (ref 136–145)

## 2020-07-03 PROCEDURE — 80048 BASIC METABOLIC PNL TOTAL CA: CPT

## 2020-07-03 PROCEDURE — 36415 COLL VENOUS BLD VENIPUNCTURE: CPT

## 2020-07-13 ENCOUNTER — HOSPITAL ENCOUNTER (OUTPATIENT)
Dept: RADIOLOGY | Age: 65
Discharge: HOME | End: 2020-07-13
Payer: COMMERCIAL

## 2020-07-13 DIAGNOSIS — C67.9 UROTHELIAL CARCINOMA OF BLADDER (HCC): ICD-10-CM

## 2020-07-13 PROCEDURE — 74178 CT ABD&PLV WO CNTR FLWD CNTR: CPT

## 2020-07-13 RX ADMIN — IOHEXOL 100 ML: 350 INJECTION, SOLUTION INTRAVENOUS at 09:49

## 2020-07-15 ENCOUNTER — TELEPHONE (OUTPATIENT)
Dept: UROLOGY | Facility: MEDICAL CENTER | Age: 65
End: 2020-07-15

## 2020-07-16 ENCOUNTER — TELEPHONE (OUTPATIENT)
Dept: UROLOGY | Facility: CLINIC | Age: 65
End: 2020-07-16

## 2020-07-16 NOTE — TELEPHONE ENCOUNTER
Please inform patient that CT scan does not reveal any new findings of cancer  Will keep appointment for follow-up cystoscopy

## 2020-07-16 NOTE — TELEPHONE ENCOUNTER
Call placed to patient, advised per Dr Maddie Dill that CT does not reveal any new findings of cancer  Advised patient he is to follow up as scheduled for cysto  Patient verbalized understanding and thanked me for the good news

## 2020-10-05 ENCOUNTER — PROCEDURE VISIT (OUTPATIENT)
Dept: UROLOGY | Facility: AMBULATORY SURGERY CENTER | Age: 65
End: 2020-10-05
Payer: COMMERCIAL

## 2020-10-05 VITALS
TEMPERATURE: 97.7 F | SYSTOLIC BLOOD PRESSURE: 144 MMHG | DIASTOLIC BLOOD PRESSURE: 82 MMHG | WEIGHT: 179.2 LBS | BODY MASS INDEX: 27.16 KG/M2 | HEIGHT: 68 IN | HEART RATE: 70 BPM

## 2020-10-05 DIAGNOSIS — C67.9 UROTHELIAL CARCINOMA OF BLADDER (HCC): Primary | ICD-10-CM

## 2020-10-05 LAB
SL AMB  POCT GLUCOSE, UA: NORMAL
SL AMB LEUKOCYTE ESTERASE,UA: NORMAL
SL AMB POCT BILIRUBIN,UA: NORMAL
SL AMB POCT BLOOD,UA: NORMAL
SL AMB POCT CLARITY,UA: CLEAR
SL AMB POCT COLOR,UA: YELLOW
SL AMB POCT KETONES,UA: NORMAL
SL AMB POCT NITRITE,UA: NORMAL
SL AMB POCT PH,UA: 5
SL AMB POCT SPECIFIC GRAVITY,UA: 1.01
SL AMB POCT URINE PROTEIN: NORMAL
SL AMB POCT UROBILINOGEN: 0.2

## 2020-10-05 PROCEDURE — 81002 URINALYSIS NONAUTO W/O SCOPE: CPT | Performed by: UROLOGY

## 2020-10-05 PROCEDURE — 88112 CYTOPATH CELL ENHANCE TECH: CPT | Performed by: PATHOLOGY

## 2020-10-05 PROCEDURE — 52000 CYSTOURETHROSCOPY: CPT | Performed by: UROLOGY

## 2021-03-08 DIAGNOSIS — Z12.5 SCREENING FOR PROSTATE CANCER: Primary | ICD-10-CM

## 2021-03-31 ENCOUNTER — APPOINTMENT (OUTPATIENT)
Dept: LAB | Age: 66
End: 2021-03-31
Payer: COMMERCIAL

## 2021-03-31 ENCOUNTER — TELEPHONE (OUTPATIENT)
Dept: UROLOGY | Facility: AMBULATORY SURGERY CENTER | Age: 66
End: 2021-03-31

## 2021-03-31 DIAGNOSIS — Z12.5 SCREENING FOR PROSTATE CANCER: ICD-10-CM

## 2021-03-31 LAB — PSA SERPL-MCNC: 0.6 NG/ML (ref 0–4)

## 2021-03-31 PROCEDURE — 84153 ASSAY OF PSA TOTAL: CPT

## 2021-03-31 NOTE — TELEPHONE ENCOUNTER
Called patient to remind him of upcoming appointment 4/5 , also to inform him we melquiades like him to get a blood work for this appointment patient agreeable to getting done prior to appointment

## 2021-04-05 ENCOUNTER — PROCEDURE VISIT (OUTPATIENT)
Dept: UROLOGY | Facility: AMBULATORY SURGERY CENTER | Age: 66
End: 2021-04-05
Payer: COMMERCIAL

## 2021-04-05 VITALS
SYSTOLIC BLOOD PRESSURE: 152 MMHG | WEIGHT: 173 LBS | BODY MASS INDEX: 26.22 KG/M2 | HEIGHT: 68 IN | DIASTOLIC BLOOD PRESSURE: 72 MMHG | HEART RATE: 70 BPM

## 2021-04-05 DIAGNOSIS — C67.9 UROTHELIAL CARCINOMA OF BLADDER (HCC): Primary | ICD-10-CM

## 2021-04-05 LAB
SL AMB  POCT GLUCOSE, UA: 1000
SL AMB LEUKOCYTE ESTERASE,UA: NORMAL
SL AMB POCT BILIRUBIN,UA: NORMAL
SL AMB POCT BLOOD,UA: NORMAL
SL AMB POCT CLARITY,UA: CLEAR
SL AMB POCT COLOR,UA: YELLOW
SL AMB POCT KETONES,UA: NORMAL
SL AMB POCT NITRITE,UA: NORMAL
SL AMB POCT PH,UA: 6
SL AMB POCT SPECIFIC GRAVITY,UA: 1.02
SL AMB POCT URINE PROTEIN: NORMAL
SL AMB POCT UROBILINOGEN: 0.2

## 2021-04-05 PROCEDURE — 88112 CYTOPATH CELL ENHANCE TECH: CPT | Performed by: PATHOLOGY

## 2021-04-05 PROCEDURE — 81002 URINALYSIS NONAUTO W/O SCOPE: CPT | Performed by: UROLOGY

## 2021-04-05 PROCEDURE — 52000 CYSTOURETHROSCOPY: CPT | Performed by: UROLOGY

## 2021-04-05 NOTE — PROGRESS NOTES
Stanislaw is a 62 y  o  male with history of recurrent high-grade urothelial carcinoma of the bladder and left joel ureteral area   He underwent BCG x6  Haseeb Learn has been no maintenance therapy  Rabia Samuels has had superficial recurrences since that time, approximately every 6 months, most recently June 2020      he has no current urologic complaints  PSA 0 6  Cystoscopy     Date/Time 4/5/2021 10:01 AM     Performed by  Rosana Graham MD     Authorized by Rosana Graham MD          Procedure Details:  Procedure type: cystoscopy    Additional Procedure Details:   Patient was prepped with Betadine  2% lidocaine jelly was instilled per urethra  The 16 [place] flexible cystoscope was placed  There is no urethral abnormality  Prostate is nonobstructing  Evaluation of the bladder revealed normal mucosa  Right ureteral orifice is normal   Left ureteral orifice has been previously resected and is free from any evidence of malignancy  Retroflexion confirms no bladder neck lesions  There is no evidence of suspicious mass or lesion  Plan   Will continue with cystoscopy surveillance in another 6 months  Check urine cytology  Periodic upper tract evaluations

## 2021-04-08 ENCOUNTER — TELEPHONE (OUTPATIENT)
Dept: UROLOGY | Facility: CLINIC | Age: 66
End: 2021-04-08

## 2021-08-20 NOTE — PROGRESS NOTES
Patient called  Recently in the hospital for acute appendicitis  Patient underwent a CT scan which showed a mass in the bladder  Patient needs urologic workup and evaluation  Dr. Lina Murray

## 2021-10-26 ENCOUNTER — PROCEDURE VISIT (OUTPATIENT)
Dept: UROLOGY | Facility: AMBULATORY SURGERY CENTER | Age: 66
End: 2021-10-26
Payer: COMMERCIAL

## 2021-10-26 VITALS
SYSTOLIC BLOOD PRESSURE: 132 MMHG | DIASTOLIC BLOOD PRESSURE: 68 MMHG | HEIGHT: 68 IN | BODY MASS INDEX: 27.1 KG/M2 | HEART RATE: 60 BPM | WEIGHT: 178.8 LBS

## 2021-10-26 DIAGNOSIS — C67.9 UROTHELIAL CARCINOMA OF BLADDER (HCC): Primary | ICD-10-CM

## 2021-10-26 LAB
SL AMB  POCT GLUCOSE, UA: NORMAL
SL AMB LEUKOCYTE ESTERASE,UA: NORMAL
SL AMB POCT BILIRUBIN,UA: NORMAL
SL AMB POCT BLOOD,UA: NORMAL
SL AMB POCT CLARITY,UA: CLEAR
SL AMB POCT COLOR,UA: YELLOW
SL AMB POCT KETONES,UA: NORMAL
SL AMB POCT NITRITE,UA: NORMAL
SL AMB POCT PH,UA: 5
SL AMB POCT SPECIFIC GRAVITY,UA: 1.01
SL AMB POCT URINE PROTEIN: NORMAL
SL AMB POCT UROBILINOGEN: NORMAL

## 2021-10-26 PROCEDURE — 52000 CYSTOURETHROSCOPY: CPT | Performed by: UROLOGY

## 2021-10-26 PROCEDURE — 81002 URINALYSIS NONAUTO W/O SCOPE: CPT | Performed by: UROLOGY

## 2021-12-06 ENCOUNTER — OFFICE VISIT (OUTPATIENT)
Dept: INTERNAL MEDICINE CLINIC | Facility: CLINIC | Age: 66
End: 2021-12-06
Payer: COMMERCIAL

## 2021-12-06 VITALS
BODY MASS INDEX: 26.52 KG/M2 | DIASTOLIC BLOOD PRESSURE: 62 MMHG | TEMPERATURE: 97.5 F | WEIGHT: 175 LBS | SYSTOLIC BLOOD PRESSURE: 118 MMHG | HEIGHT: 68 IN | OXYGEN SATURATION: 97 % | HEART RATE: 74 BPM

## 2021-12-06 DIAGNOSIS — E66.3 OVERWEIGHT: ICD-10-CM

## 2021-12-06 DIAGNOSIS — C67.5 MALIGNANT NEOPLASM OF URINARY BLADDER NECK (HCC): ICD-10-CM

## 2021-12-06 DIAGNOSIS — F17.210 CIGARETTE NICOTINE DEPENDENCE WITHOUT COMPLICATION: ICD-10-CM

## 2021-12-06 DIAGNOSIS — J20.8 ACUTE BRONCHITIS DUE TO OTHER SPECIFIED ORGANISMS: ICD-10-CM

## 2021-12-06 DIAGNOSIS — Z00.00 HEALTHCARE MAINTENANCE: ICD-10-CM

## 2021-12-06 DIAGNOSIS — I10 PRIMARY HYPERTENSION: Primary | ICD-10-CM

## 2021-12-06 PROCEDURE — 99214 OFFICE O/P EST MOD 30 MIN: CPT | Performed by: INTERNAL MEDICINE

## 2021-12-06 RX ORDER — DOXYCYCLINE HYCLATE 100 MG/1
CAPSULE ORAL
COMMUNITY
Start: 2021-12-03 | End: 2022-07-08 | Stop reason: ALTCHOICE

## 2021-12-06 RX ORDER — ALBUTEROL SULFATE 90 UG/1
AEROSOL, METERED RESPIRATORY (INHALATION)
COMMUNITY
Start: 2021-12-03

## 2021-12-08 ENCOUNTER — APPOINTMENT (OUTPATIENT)
Dept: LAB | Age: 66
End: 2021-12-08
Payer: COMMERCIAL

## 2021-12-08 DIAGNOSIS — I10 PRIMARY HYPERTENSION: ICD-10-CM

## 2021-12-08 DIAGNOSIS — Z00.00 HEALTHCARE MAINTENANCE: ICD-10-CM

## 2021-12-08 LAB
ALBUMIN SERPL BCP-MCNC: 3.9 G/DL (ref 3.5–5)
ALP SERPL-CCNC: 91 U/L (ref 46–116)
ALT SERPL W P-5'-P-CCNC: 45 U/L (ref 12–78)
ANION GAP SERPL CALCULATED.3IONS-SCNC: 7 MMOL/L (ref 4–13)
AST SERPL W P-5'-P-CCNC: 28 U/L (ref 5–45)
BASOPHILS # BLD AUTO: 0.06 THOUSANDS/ΜL (ref 0–0.1)
BASOPHILS NFR BLD AUTO: 1 % (ref 0–1)
BILIRUB SERPL-MCNC: 0.74 MG/DL (ref 0.2–1)
BILIRUB UR QL STRIP: NEGATIVE
BUN SERPL-MCNC: 15 MG/DL (ref 5–25)
CALCIUM SERPL-MCNC: 9 MG/DL (ref 8.3–10.1)
CHLORIDE SERPL-SCNC: 108 MMOL/L (ref 100–108)
CHOLEST SERPL-MCNC: 148 MG/DL
CLARITY UR: CLEAR
CO2 SERPL-SCNC: 24 MMOL/L (ref 21–32)
COLOR UR: YELLOW
CREAT SERPL-MCNC: 0.99 MG/DL (ref 0.6–1.3)
EOSINOPHIL # BLD AUTO: 0.24 THOUSAND/ΜL (ref 0–0.61)
EOSINOPHIL NFR BLD AUTO: 3 % (ref 0–6)
ERYTHROCYTE [DISTWIDTH] IN BLOOD BY AUTOMATED COUNT: 12.8 % (ref 11.6–15.1)
GFR SERPL CREATININE-BSD FRML MDRD: 80 ML/MIN/1.73SQ M
GLUCOSE P FAST SERPL-MCNC: 160 MG/DL (ref 65–99)
GLUCOSE UR STRIP-MCNC: ABNORMAL MG/DL
HCT VFR BLD AUTO: 46.9 % (ref 36.5–49.3)
HDLC SERPL-MCNC: 40 MG/DL
HGB BLD-MCNC: 16 G/DL (ref 12–17)
HGB UR QL STRIP.AUTO: NEGATIVE
IMM GRANULOCYTES # BLD AUTO: 0.02 THOUSAND/UL (ref 0–0.2)
IMM GRANULOCYTES NFR BLD AUTO: 0 % (ref 0–2)
KETONES UR STRIP-MCNC: NEGATIVE MG/DL
LDLC SERPL CALC-MCNC: 91 MG/DL (ref 0–100)
LEUKOCYTE ESTERASE UR QL STRIP: NEGATIVE
LYMPHOCYTES # BLD AUTO: 2.94 THOUSANDS/ΜL (ref 0.6–4.47)
LYMPHOCYTES NFR BLD AUTO: 36 % (ref 14–44)
MCH RBC QN AUTO: 31.2 PG (ref 26.8–34.3)
MCHC RBC AUTO-ENTMCNC: 34.1 G/DL (ref 31.4–37.4)
MCV RBC AUTO: 91 FL (ref 82–98)
MONOCYTES # BLD AUTO: 0.71 THOUSAND/ΜL (ref 0.17–1.22)
MONOCYTES NFR BLD AUTO: 9 % (ref 4–12)
NEUTROPHILS # BLD AUTO: 4.22 THOUSANDS/ΜL (ref 1.85–7.62)
NEUTS SEG NFR BLD AUTO: 51 % (ref 43–75)
NITRITE UR QL STRIP: NEGATIVE
NONHDLC SERPL-MCNC: 108 MG/DL
NRBC BLD AUTO-RTO: 0 /100 WBCS
PH UR STRIP.AUTO: 6 [PH]
PLATELET # BLD AUTO: 221 THOUSANDS/UL (ref 149–390)
PMV BLD AUTO: 10.3 FL (ref 8.9–12.7)
POTASSIUM SERPL-SCNC: 4.2 MMOL/L (ref 3.5–5.3)
PROT SERPL-MCNC: 7.8 G/DL (ref 6.4–8.2)
PROT UR STRIP-MCNC: NEGATIVE MG/DL
RBC # BLD AUTO: 5.13 MILLION/UL (ref 3.88–5.62)
SODIUM SERPL-SCNC: 139 MMOL/L (ref 136–145)
SP GR UR STRIP.AUTO: 1.02 (ref 1–1.03)
TRIGL SERPL-MCNC: 83 MG/DL
UROBILINOGEN UR QL STRIP.AUTO: 0.2 E.U./DL
WBC # BLD AUTO: 8.19 THOUSAND/UL (ref 4.31–10.16)

## 2021-12-08 PROCEDURE — 80061 LIPID PANEL: CPT

## 2021-12-08 PROCEDURE — 80053 COMPREHEN METABOLIC PANEL: CPT

## 2021-12-08 PROCEDURE — 36415 COLL VENOUS BLD VENIPUNCTURE: CPT

## 2021-12-08 PROCEDURE — 81003 URINALYSIS AUTO W/O SCOPE: CPT

## 2021-12-08 PROCEDURE — 85025 COMPLETE CBC W/AUTO DIFF WBC: CPT

## 2021-12-10 ENCOUNTER — OFFICE VISIT (OUTPATIENT)
Dept: INTERNAL MEDICINE CLINIC | Facility: CLINIC | Age: 66
End: 2021-12-10
Payer: COMMERCIAL

## 2021-12-10 VITALS
HEIGHT: 68 IN | DIASTOLIC BLOOD PRESSURE: 74 MMHG | OXYGEN SATURATION: 97 % | TEMPERATURE: 97.6 F | SYSTOLIC BLOOD PRESSURE: 140 MMHG | WEIGHT: 175 LBS | HEART RATE: 71 BPM | BODY MASS INDEX: 26.52 KG/M2

## 2021-12-10 DIAGNOSIS — E11.9 TYPE 2 DIABETES MELLITUS WITHOUT COMPLICATION, WITHOUT LONG-TERM CURRENT USE OF INSULIN (HCC): Primary | ICD-10-CM

## 2021-12-10 DIAGNOSIS — I10 PRIMARY HYPERTENSION: ICD-10-CM

## 2021-12-10 DIAGNOSIS — J20.8 ACUTE BRONCHITIS DUE TO OTHER SPECIFIED ORGANISMS: ICD-10-CM

## 2021-12-10 PROBLEM — R73.9 HYPERGLYCEMIA: Status: ACTIVE | Noted: 2021-12-10

## 2021-12-10 LAB — SL AMB POCT HEMOGLOBIN AIC: 8.1 (ref ?–6.5)

## 2021-12-10 PROCEDURE — 83036 HEMOGLOBIN GLYCOSYLATED A1C: CPT | Performed by: INTERNAL MEDICINE

## 2021-12-10 PROCEDURE — 99214 OFFICE O/P EST MOD 30 MIN: CPT | Performed by: INTERNAL MEDICINE

## 2021-12-10 RX ORDER — METFORMIN HYDROCHLORIDE 500 MG/1
500 TABLET, EXTENDED RELEASE ORAL
Qty: 30 TABLET | Refills: 5 | Status: SHIPPED | OUTPATIENT
Start: 2021-12-10 | End: 2022-06-03 | Stop reason: SDUPTHER

## 2021-12-14 ENCOUNTER — OFFICE VISIT (OUTPATIENT)
Dept: INTERNAL MEDICINE CLINIC | Facility: CLINIC | Age: 66
End: 2021-12-14
Payer: COMMERCIAL

## 2021-12-14 VITALS
BODY MASS INDEX: 26.67 KG/M2 | DIASTOLIC BLOOD PRESSURE: 80 MMHG | SYSTOLIC BLOOD PRESSURE: 138 MMHG | WEIGHT: 176 LBS | TEMPERATURE: 97.1 F | HEIGHT: 68 IN | HEART RATE: 76 BPM | OXYGEN SATURATION: 98 %

## 2021-12-14 DIAGNOSIS — E11.9 TYPE 2 DIABETES MELLITUS WITHOUT COMPLICATION, WITHOUT LONG-TERM CURRENT USE OF INSULIN (HCC): ICD-10-CM

## 2021-12-14 DIAGNOSIS — J20.8 ACUTE BRONCHITIS DUE TO OTHER SPECIFIED ORGANISMS: Primary | ICD-10-CM

## 2021-12-14 PROCEDURE — 99213 OFFICE O/P EST LOW 20 MIN: CPT | Performed by: INTERNAL MEDICINE

## 2021-12-14 RX ORDER — LEVOFLOXACIN 500 MG/1
500 TABLET, FILM COATED ORAL EVERY 24 HOURS
Qty: 7 TABLET | Refills: 0 | Status: SHIPPED | OUTPATIENT
Start: 2021-12-14 | End: 2021-12-21

## 2021-12-14 RX ORDER — PREDNISONE 10 MG/1
10 TABLET ORAL DAILY
Qty: 21 TABLET | Refills: 0 | Status: SHIPPED | OUTPATIENT
Start: 2021-12-14 | End: 2022-06-03 | Stop reason: ALTCHOICE

## 2021-12-20 ENCOUNTER — OFFICE VISIT (OUTPATIENT)
Dept: INTERNAL MEDICINE CLINIC | Facility: CLINIC | Age: 66
End: 2021-12-20
Payer: COMMERCIAL

## 2021-12-20 VITALS
TEMPERATURE: 97.2 F | HEIGHT: 68 IN | SYSTOLIC BLOOD PRESSURE: 140 MMHG | HEART RATE: 71 BPM | WEIGHT: 174 LBS | BODY MASS INDEX: 26.37 KG/M2 | DIASTOLIC BLOOD PRESSURE: 70 MMHG | OXYGEN SATURATION: 96 %

## 2021-12-20 DIAGNOSIS — F17.210 CIGARETTE NICOTINE DEPENDENCE WITHOUT COMPLICATION: Primary | ICD-10-CM

## 2021-12-20 DIAGNOSIS — E11.9 TYPE 2 DIABETES MELLITUS WITHOUT COMPLICATION, WITHOUT LONG-TERM CURRENT USE OF INSULIN (HCC): ICD-10-CM

## 2021-12-20 DIAGNOSIS — I10 PRIMARY HYPERTENSION: ICD-10-CM

## 2021-12-20 DIAGNOSIS — J20.8 ACUTE BRONCHITIS DUE TO OTHER SPECIFIED ORGANISMS: ICD-10-CM

## 2021-12-20 PROCEDURE — 99214 OFFICE O/P EST MOD 30 MIN: CPT | Performed by: INTERNAL MEDICINE

## 2022-05-31 ENCOUNTER — PROCEDURE VISIT (OUTPATIENT)
Dept: UROLOGY | Facility: AMBULATORY SURGERY CENTER | Age: 67
End: 2022-05-31
Payer: MEDICARE

## 2022-05-31 VITALS
HEART RATE: 74 BPM | OXYGEN SATURATION: 97 % | SYSTOLIC BLOOD PRESSURE: 142 MMHG | HEIGHT: 68 IN | DIASTOLIC BLOOD PRESSURE: 84 MMHG | BODY MASS INDEX: 26.37 KG/M2 | WEIGHT: 174 LBS

## 2022-05-31 DIAGNOSIS — C67.9 UROTHELIAL CARCINOMA OF BLADDER (HCC): Primary | ICD-10-CM

## 2022-05-31 LAB
SL AMB  POCT GLUCOSE, UA: 2000
SL AMB LEUKOCYTE ESTERASE,UA: NORMAL
SL AMB POCT BILIRUBIN,UA: NORMAL
SL AMB POCT BLOOD,UA: NORMAL
SL AMB POCT CLARITY,UA: CLEAR
SL AMB POCT COLOR,UA: YELLOW
SL AMB POCT KETONES,UA: NORMAL
SL AMB POCT NITRITE,UA: NORMAL
SL AMB POCT PH,UA: 5
SL AMB POCT SPECIFIC GRAVITY,UA: 1.01
SL AMB POCT URINE PROTEIN: NORMAL
SL AMB POCT UROBILINOGEN: 0.2

## 2022-05-31 PROCEDURE — 52000 CYSTOURETHROSCOPY: CPT | Performed by: UROLOGY

## 2022-05-31 PROCEDURE — 81002 URINALYSIS NONAUTO W/O SCOPE: CPT | Performed by: UROLOGY

## 2022-05-31 NOTE — PROGRESS NOTES
Stanislaw is a 62 y  o  male with history of recurrent high-grade urothelial carcinoma of the bladder and left joel ureteral area   He underwent BCG x6  Devere Patric has been no maintenance therapy  Matt Junior has had superficial recurrences since that time, approximately every 6 months, most recently June 2020      No hematuria  Denies urinary complaints  Cystoscopy     Date/Time 5/31/2022 10:03 AM     Performed by  Dom Chaves MD     Authorized by Dom Chaves MD          Procedure Details:  Procedure type: cystoscopy    Additional Procedure Details: Patient was prepped with Betadine  2% lidocaine jelly was instilled per urethra  The 16 [place] flexible cystoscope was placed  There is no urethral abnormality noted  Prostate is noted to be enlarged with mild but not complete obstruction  The left ureteral orifice has been previously resected and is widely patent  The right orifice is normal   There are prominent vessels at the bladder neck posteriorly  There is no suspicious mass or lesion noted  This is confirmed with retroflexion  Plan   Patient was reassured  Follow-up cystoscopy in 6 months  Patient is due for imaging studies  Will plan CT scan renal protocol

## 2022-06-03 ENCOUNTER — OFFICE VISIT (OUTPATIENT)
Dept: INTERNAL MEDICINE CLINIC | Facility: CLINIC | Age: 67
End: 2022-06-03
Payer: MEDICARE

## 2022-06-03 VITALS
HEART RATE: 78 BPM | OXYGEN SATURATION: 97 % | HEIGHT: 68 IN | SYSTOLIC BLOOD PRESSURE: 146 MMHG | TEMPERATURE: 97.6 F | BODY MASS INDEX: 26.07 KG/M2 | WEIGHT: 172 LBS | DIASTOLIC BLOOD PRESSURE: 76 MMHG

## 2022-06-03 DIAGNOSIS — I10 PRIMARY HYPERTENSION: ICD-10-CM

## 2022-06-03 DIAGNOSIS — C67.9 UROTHELIAL CARCINOMA OF BLADDER (HCC): ICD-10-CM

## 2022-06-03 DIAGNOSIS — E11.9 TYPE 2 DIABETES MELLITUS WITHOUT COMPLICATION, WITHOUT LONG-TERM CURRENT USE OF INSULIN (HCC): Primary | ICD-10-CM

## 2022-06-03 DIAGNOSIS — F17.210 CIGARETTE NICOTINE DEPENDENCE WITHOUT COMPLICATION: ICD-10-CM

## 2022-06-03 PROCEDURE — 99214 OFFICE O/P EST MOD 30 MIN: CPT | Performed by: INTERNAL MEDICINE

## 2022-06-03 RX ORDER — METFORMIN HYDROCHLORIDE 500 MG/1
500 TABLET, EXTENDED RELEASE ORAL
Qty: 30 TABLET | Refills: 5 | Status: SHIPPED | OUTPATIENT
Start: 2022-06-03 | End: 2022-06-28

## 2022-06-03 NOTE — ASSESSMENT & PLAN NOTE
Patient states he has cut down on his tobacco abuse and is only smoking socially  Patient was told with his underlying medical problems is extremely important that he quit completely    We have offered different modalities with the patient to help him with taking the habit but he wishes to do this and initially on his own

## 2022-06-03 NOTE — PROGRESS NOTES
Assessment/Plan:    Hypertension  Patient's sugar is still mildly elevated recent in the office today  With history of diabetes did discuss with the patient the importance of treatment but before initiation medication want to check on his renal function and orders have been written  Will be seen in the office in the future and make some decisions as to treatment most likely will go on ACE-inhibitor initially    Type 2 diabetes mellitus without complication, without long-term current use of insulin ([facility] [address] )  Orders for labs has been written the patient because of lack insurance did not these performed prior to the visit  States that he does have increased urination at night and was not taking metformin again because of lack of insurance  We will restart this medication check a fasting blood sugar and hemoglobin A1c in urine for microalbumin  Make adjustments to medication if necessary  Did discuss with the patient the importance routine eye exams and referred the patient be seen by ophthalmologist locally  Lab Results   Component Value Date    HGBA1C 8 1 (A) 12/10/2021       Nicotine dependence  Patient states he has cut down on his tobacco abuse and is only smoking socially  Patient was told with his underlying medical problems is extremely important that he quit completely  We have offered different modalities with the patient to help him with taking the habit but he wishes to do this and initially on his own    Urothelial carcinoma of bladder [facility])  Was just seen and evaluated recently by his urologist   Cystoscopy was performed  No evidence of recurrence of disease  Scheduled for a CT scan of the abdomen to make sure there is no metastatic disease related to his cancer  Diagnoses and all orders for this visit:    Type 2 diabetes mellitus without complication, without long-term current use of insulin (HCC)  -     metFORMIN (GLUCOPHAGE-XR) 500 mg 24 hr tablet;  Take 1 tablet (500 mg total) by mouth daily with dinner  -     Hemoglobin A1C; Future  -     Lipid panel; Future  -     Microalbumin / creatinine urine ratio    Urothelial carcinoma of bladder (HCC)    Primary hypertension  -     Basic metabolic panel; Future  -     Lipid panel; Future    Cigarette nicotine dependence without complication          Subjective:      Patient ID: Regan Chaudhry is a 73 y o  male  Patient is a 61-year-old male history of medical problems as outlined previously including borderline hypertension diabetes mellitus type 2, history of bladder cancer  Patient is here today for follow-up  He has not been seen in extended period of time because of lack of insurance but now is on Medicare  Patient states in general doing well, recently seen by Urology and cystoscopy was performed with no evidence of recurrence of disease  he has not been taking medication for his diabetes because of again lack of insurance but will restart this in the near future  Denies any chest pain or pressure and no increasing shortness of breath  States his appetite is adequate no bowel or bladder difficulties  The following portions of the patient's history were reviewed and updated as appropriate:   He  has a past medical history of History of transfusion (2013), Infectious viral hepatitis, and URI (upper respiratory infection)    He   Patient Active Problem List    Diagnosis Date Noted    Hyperglycemia 12/10/2021    Type 2 diabetes mellitus without complication, without long-term current use of insulin ([facility] [address] ) 12/10/2021    Healthcare maintenance 12/06/2021    Overweight 12/06/2021    COVID-19 04/20/2020    Acute bronchitis due to other specified organisms 11/25/2019    Malignant neoplasm of urinary bladder ([facility] [address] ) 11/04/2019    Flu-like symptoms 03/06/2019    Urothelial carcinoma of bladder ([facility] [address] ) 09/26/2018    Status post appendectomy 08/15/2018    Bladder mass 08/15/2018    Generalized anxiety disorder 09/02/2016    Hepatitis C without hepatic coma 12/28/2015    Hypertension 11/06/2015    Atopic dermatitis 11/06/2015    Nicotine dependence 08/23/2012     He  has a past surgical history that includes Appendectomy; Ankle fracture surgery (2013); Shoulder arthroscopy (Bilateral); Tonsillectomy; Colonoscopy; pr cystourethroscopy,fulgur 0 5-2 cm lesn (N/A, 9/12/2018); pr cystourethroscopy,fulgur <0 5 cm lesn (N/A, 9/12/2018); pr cystourethroscopy,fulgur <0 5 cm lesn (N/A, 11/14/2018); pr cysto/uretero/pyeloscopy, dx (Left, 4/10/2019); FL retrograde pyelogram (4/10/2019); pr cystourethroscopy,fulgur <0 5 cm lesn (N/A, 12/11/2019); pr cysto/uretero/pyeloscopy, dx (Left, 12/11/2019); INSTILLATION MYTOMYCIN (N/A, 12/11/2019); pr cystourethroscopy,fulgur <0 5 cm lesn (N/A, 6/9/2020); and pr instill anticancer agent in bladder (N/A, 6/9/2020)  His family history includes Brain cancer in his father; Kidney failure in his mother  He  reports that he quit smoking about 3 years ago  He smoked 0 25 packs per day  He has never used smokeless tobacco  He reports previous alcohol use  He reports that he does not use drugs  Current Outpatient Medications   Medication Sig Dispense Refill    albuterol (PROVENTIL HFA,VENTOLIN HFA) 90 mcg/act inhaler       metFORMIN (GLUCOPHAGE-XR) 500 mg 24 hr tablet Take 1 tablet (500 mg total) by mouth daily with dinner 30 tablet 5    doxycycline hyclate (VIBRAMYCIN) 100 mg capsule  (Patient not taking: No sig reported)       No current facility-administered medications for this visit       Current Outpatient Medications on File Prior to Visit   Medication Sig    albuterol (PROVENTIL HFA,VENTOLIN HFA) 90 mcg/act inhaler     [DISCONTINUED] metFORMIN (GLUCOPHAGE-XR) 500 mg 24 hr tablet Take 1 tablet (500 mg total) by mouth daily with dinner    doxycycline hyclate (VIBRAMYCIN) 100 mg capsule  (Patient not taking: No sig reported)    [DISCONTINUED] predniSONE 10 mg tablet Take 1 tablet (10 mg total) by mouth daily Two pills twice a day for 3 days with food than 1 pill twice a day with food for 3 days then 1 pill daily till finished     No current facility-administered medications on file prior to visit  He is allergic to hydrocodone-acetaminophen       Review of Systems   Constitutional: Negative  HENT: Negative  Eyes: Negative  Respiratory: Negative  Cardiovascular: Negative  Gastrointestinal: Negative  Endocrine: Negative  Genitourinary: Positive for frequency (Frequent urination with nocturia 3-4 times per night)  Negative for decreased urine volume, difficulty urinating, dysuria, enuresis, flank pain, genital sores, hematuria, penile discharge, penile pain, penile swelling, scrotal swelling, testicular pain and urgency  Musculoskeletal: Negative  Skin: Negative  Allergic/Immunologic: Negative  Neurological: Negative  Hematological: Negative  Psychiatric/Behavioral: Negative  Objective:      /76   Pulse 78   Temp 97 6 °F (36 4 °C)   Ht 5' 8" (1 727 m)   Wt 78 kg (172 lb)   SpO2 97%   BMI 26 15 kg/m²          Physical Exam  Vitals and nursing note reviewed  Constitutional:       General: He is not in acute distress  Appearance: Normal appearance  He is not ill-appearing, toxic-appearing or diaphoretic  Comments: Patient is a pleasant overweight 52-year-old male who is awake alert no acute distress and oriented x3   HENT:      Head: Normocephalic and atraumatic  Comments: Decreased auditory acuity bilaterally     Right Ear: Tympanic membrane, ear canal and external ear normal  There is no impacted cerumen  Left Ear: Tympanic membrane, ear canal and external ear normal  There is no impacted cerumen  Nose: Nose normal  No congestion or rhinorrhea  Mouth/Throat:      Mouth: Mucous membranes are moist       Pharynx: Oropharynx is clear  No oropharyngeal exudate or posterior oropharyngeal erythema  Eyes:      General: No scleral icterus  Right eye: No discharge  Left eye: No discharge  Extraocular Movements: Extraocular movements intact  Conjunctiva/sclera: Conjunctivae normal       Pupils: Pupils are equal, round, and reactive to light  Neck:      Vascular: No carotid bruit  Cardiovascular:      Rate and Rhythm: Normal rate and regular rhythm  Pulses: Normal pulses  Heart sounds: Normal heart sounds  No murmur heard  No friction rub  No gallop  Pulmonary:      Effort: Pulmonary effort is normal  No respiratory distress  Breath sounds: Normal breath sounds  No stridor  No wheezing, rhonchi or rales  Chest:      Chest wall: No tenderness  Abdominal:      General: Bowel sounds are normal  There is no distension  Palpations: Abdomen is soft  There is no mass  Tenderness: There is no abdominal tenderness  There is no right CVA tenderness, left CVA tenderness, guarding or rebound  Hernia: No hernia is present  Musculoskeletal:         General: No swelling, tenderness, deformity or signs of injury  Normal range of motion  Cervical back: Normal range of motion and neck supple  No rigidity or tenderness  Right lower leg: No edema  Left lower leg: No edema  Lymphadenopathy:      Cervical: No cervical adenopathy  Skin:     General: Skin is warm and dry  Capillary Refill: Capillary refill takes less than 2 seconds  Coloration: Skin is not jaundiced or pale  Findings: No bruising, erythema, lesion or rash  Neurological:      General: No focal deficit present  Mental Status: He is alert and oriented to person, place, and time  Mental status is at baseline  Cranial Nerves: No cranial nerve deficit  Sensory: No sensory deficit  Motor: No weakness        Coordination: Coordination normal       Gait: Gait normal       Deep Tendon Reflexes: Reflexes normal    Psychiatric:         Mood and Affect: Mood normal          Behavior: Behavior normal  Thought Content:  Thought content normal          Judgment: Judgment normal

## 2022-06-03 NOTE — ASSESSMENT & PLAN NOTE
Patient's sugar is still mildly elevated recent in the office today  With history of diabetes did discuss with the patient the importance of treatment but before initiation medication want to check on his renal function and orders have been written    Will be seen in the office in the future and make some decisions as to treatment most likely will go on ACE-inhibitor initially

## 2022-06-03 NOTE — ASSESSMENT & PLAN NOTE
Orders for labs has been written the patient because of lack insurance did not these performed prior to the visit  States that he does have increased urination at night and was not taking metformin again because of lack of insurance  We will restart this medication check a fasting blood sugar and hemoglobin A1c in urine for microalbumin  Make adjustments to medication if necessary    Did discuss with the patient the importance routine eye exams and referred the patient be seen by ophthalmologist locally  Lab Results   Component Value Date    HGBA1C 8 1 (A) 12/10/2021

## 2022-06-03 NOTE — ASSESSMENT & PLAN NOTE
Was just seen and evaluated recently by his urologist   Cystoscopy was performed  No evidence of recurrence of disease  Scheduled for a CT scan of the abdomen to make sure there is no metastatic disease related to his cancer

## 2022-06-28 DIAGNOSIS — E11.9 TYPE 2 DIABETES MELLITUS WITHOUT COMPLICATION, WITHOUT LONG-TERM CURRENT USE OF INSULIN (HCC): ICD-10-CM

## 2022-06-28 RX ORDER — METFORMIN HYDROCHLORIDE 500 MG/1
TABLET, EXTENDED RELEASE ORAL
Qty: 90 TABLET | Refills: 2 | Status: SHIPPED | OUTPATIENT
Start: 2022-06-28

## 2022-07-05 ENCOUNTER — RA CDI HCC (OUTPATIENT)
Dept: OTHER | Facility: HOSPITAL | Age: 67
End: 2022-07-05

## 2022-07-05 NOTE — PROGRESS NOTES
[address]  coding opportunities       Chart reviewed, no opportunity found: CHART REVIEWED, NO OPPORTUNITY FOUND        Patients Insurance     Medicare Insurance: Medicare

## 2022-07-06 ENCOUNTER — APPOINTMENT (OUTPATIENT)
Dept: LAB | Age: 67
End: 2022-07-06
Payer: MEDICARE

## 2022-07-06 DIAGNOSIS — I10 PRIMARY HYPERTENSION: ICD-10-CM

## 2022-07-06 DIAGNOSIS — E11.9 TYPE 2 DIABETES MELLITUS WITHOUT COMPLICATION, WITHOUT LONG-TERM CURRENT USE OF INSULIN (HCC): ICD-10-CM

## 2022-07-06 DIAGNOSIS — C67.9 UROTHELIAL CARCINOMA OF BLADDER (HCC): ICD-10-CM

## 2022-07-06 LAB
ANION GAP SERPL CALCULATED.3IONS-SCNC: 3 MMOL/L (ref 4–13)
BUN SERPL-MCNC: 17 MG/DL (ref 5–25)
CALCIUM SERPL-MCNC: 9.1 MG/DL (ref 8.3–10.1)
CHLORIDE SERPL-SCNC: 107 MMOL/L (ref 100–108)
CHOLEST SERPL-MCNC: 173 MG/DL
CO2 SERPL-SCNC: 27 MMOL/L (ref 21–32)
CREAT SERPL-MCNC: 0.87 MG/DL (ref 0.6–1.3)
CREAT UR-MCNC: 172 MG/DL
EST. AVERAGE GLUCOSE BLD GHB EST-MCNC: 189 MG/DL
GFR SERPL CREATININE-BSD FRML MDRD: 90 ML/MIN/1.73SQ M
GLUCOSE P FAST SERPL-MCNC: 203 MG/DL (ref 65–99)
HBA1C MFR BLD: 8.2 %
HDLC SERPL-MCNC: 44 MG/DL
LDLC SERPL CALC-MCNC: 102 MG/DL (ref 0–100)
MICROALBUMIN UR-MCNC: 12.3 MG/L (ref 0–20)
MICROALBUMIN/CREAT 24H UR: 7 MG/G CREATININE (ref 0–30)
NONHDLC SERPL-MCNC: 129 MG/DL
POTASSIUM SERPL-SCNC: 4.6 MMOL/L (ref 3.5–5.3)
SODIUM SERPL-SCNC: 137 MMOL/L (ref 136–145)
TRIGL SERPL-MCNC: 134 MG/DL

## 2022-07-06 PROCEDURE — 80048 BASIC METABOLIC PNL TOTAL CA: CPT

## 2022-07-06 PROCEDURE — 83036 HEMOGLOBIN GLYCOSYLATED A1C: CPT

## 2022-07-06 PROCEDURE — 82043 UR ALBUMIN QUANTITATIVE: CPT | Performed by: INTERNAL MEDICINE

## 2022-07-06 PROCEDURE — 80061 LIPID PANEL: CPT

## 2022-07-06 PROCEDURE — 36415 COLL VENOUS BLD VENIPUNCTURE: CPT

## 2022-07-06 PROCEDURE — 82570 ASSAY OF URINE CREATININE: CPT | Performed by: INTERNAL MEDICINE

## 2022-07-08 ENCOUNTER — OFFICE VISIT (OUTPATIENT)
Dept: INTERNAL MEDICINE CLINIC | Facility: CLINIC | Age: 67
End: 2022-07-08
Payer: MEDICARE

## 2022-07-08 VITALS
HEIGHT: 68 IN | DIASTOLIC BLOOD PRESSURE: 78 MMHG | TEMPERATURE: 96.5 F | RESPIRATION RATE: 16 BRPM | HEART RATE: 72 BPM | BODY MASS INDEX: 25.61 KG/M2 | OXYGEN SATURATION: 96 % | SYSTOLIC BLOOD PRESSURE: 142 MMHG | WEIGHT: 169 LBS

## 2022-07-08 DIAGNOSIS — E66.3 OVERWEIGHT: ICD-10-CM

## 2022-07-08 DIAGNOSIS — C67.5 MALIGNANT NEOPLASM OF URINARY BLADDER NECK (HCC): ICD-10-CM

## 2022-07-08 DIAGNOSIS — E11.9 TYPE 2 DIABETES MELLITUS WITHOUT COMPLICATION, WITHOUT LONG-TERM CURRENT USE OF INSULIN (HCC): Primary | ICD-10-CM

## 2022-07-08 DIAGNOSIS — I10 PRIMARY HYPERTENSION: ICD-10-CM

## 2022-07-08 DIAGNOSIS — F17.210 CIGARETTE NICOTINE DEPENDENCE WITHOUT COMPLICATION: ICD-10-CM

## 2022-07-08 PROCEDURE — 99214 OFFICE O/P EST MOD 30 MIN: CPT | Performed by: INTERNAL MEDICINE

## 2022-07-08 NOTE — ASSESSMENT & PLAN NOTE
As noted patient's blood sugar continues to show poor control  With discussion with the patient apparently he has neglect told this far as taking his medication as directed and not watching closely his caloric intake as far as concentrated sweets and simple carbohydrates  We did reinforce with the patient the importance of taking his medication specifically metformin on a daily basis in order to keep his blood sugars under control  Risk factors associated with diabetes including coronary arteries disease, peripheral vascular disease, stroke    Patient was told again to take his medication as directed and we will check a fasting blood sugar, hemoglobin A1c when he returns to the office in 4 months  Lab Results   Component Value Date    HGBA1C 8 2 (H) 07/06/2022

## 2022-07-08 NOTE — ASSESSMENT & PLAN NOTE
Patient's blood pressure is still showing marginal control  We did have discussion with the patient the importance of keeping blood pressure is under control with his underlying disease processes including diabetes     Not showing better control with his next visit will initiate treatment most likely with an ACE-inhibitor which will have beneficial affects especially in light of his diabetes

## 2022-07-08 NOTE — PROGRESS NOTES
Assessment/Plan:    Type 2 diabetes mellitus without complication, without long-term current use of insulin ([address] )  As noted patient's blood sugar continues to show poor control  With discussion with the patient apparently he has neglect told this far as taking his medication as directed and not watching closely his caloric intake as far as concentrated sweets and simple carbohydrates  We did reinforce with the patient the importance of taking his medication specifically metformin on a daily basis in order to keep his blood sugars under control  Risk factors associated with diabetes including coronary arteries disease, peripheral vascular disease, stroke  Patient was told again to take his medication as directed and we will check a fasting blood sugar, hemoglobin A1c when he returns to the office in 4 months  Lab Results   Component Value Date    HGBA1C 8 2 (H) 07/06/2022       Hypertension  Patient's blood pressure is still showing marginal control  We did have discussion with the patient the importance of keeping blood pressure is under control with his underlying disease processes including diabetes   Not showing better control with his next visit will initiate treatment most likely with an ACE-inhibitor which will have beneficial affects especially in light of his diabetes    Nicotine dependence  He is trying to cut down on smoking  States that he is only smoking approximately 5 cigarettes per day  Operation is encouraged to continue to quit completely  Denies any respiratory difficulties at this time and states he rarely if ever uses inhaler  Overweight  With the patient's BMI he is considered overweight  We discussed at length with the patient today the importance of diet, watching his caloric intake and looking at ways he could reduce his intake on a daily basis  Making sure that he exercises at least 3-5 times per week again to help with weight loss  Patient understands and agrees  Diagnoses and all orders for this visit:    Type 2 diabetes mellitus without complication, without long-term current use of insulin (HCC)  -     Hemoglobin A1C; Future    Primary hypertension  -     Basic metabolic panel; Future    Cigarette nicotine dependence without complication    Malignant neoplasm of urinary bladder neck (HCC)    Overweight          Subjective:      Patient ID: Lorenzo Nieves is a 73 y o  male  50-year-old male history of medical problems as outlined previously who is here today for follow-up  He did have extensive lab testing performed prior to the visit today we did discuss the results of length  Patient states in general he is feeling well and he is enjoying his custodial  Admits that he is not always compliant with taking his medication for his diabetes and at this time has no routine exercise program   No new complaints      The following portions of the patient's history were reviewed and updated as appropriate:   He  has a past medical history of History of transfusion (2013), Infectious viral hepatitis, and URI (upper respiratory infection)  He   Patient Active Problem List    Diagnosis Date Noted    Hyperglycemia 12/10/2021    Type 2 diabetes mellitus without complication, without long-term current use of insulin ([facility] ) 12/10/2021    Healthcare maintenance 12/06/2021    Overweight 12/06/2021    COVID-19 04/20/2020    Acute bronchitis due to other specified organisms 11/25/2019    Malignant neoplasm of urinary bladder ([facility] ) 11/04/2019    Flu-like symptoms 03/06/2019    Urothelial carcinoma of bladder ([facility] ) 09/26/2018    Status post appendectomy 08/15/2018    Bladder mass 08/15/2018    Generalized anxiety disorder 09/02/2016    Hepatitis C without hepatic coma 12/28/2015    Hypertension 11/06/2015    Atopic dermatitis 11/06/2015    Nicotine dependence 08/23/2012     He  has a past surgical history that includes Appendectomy; Ankle fracture surgery (2013);  Shoulder arthroscopy (Bilateral); Tonsillectomy; Colonoscopy; pr cystourethroscopy,fulgur 0 5-2 cm lesn (N/A, 9/12/2018); pr cystourethroscopy,fulgur <0 5 cm lesn (N/A, 9/12/2018); pr cystourethroscopy,fulgur <0 5 cm lesn (N/A, 11/14/2018); pr cysto/uretero/pyeloscopy, dx (Left, 4/10/2019); FL retrograde pyelogram (4/10/2019); pr cystourethroscopy,fulgur <0 5 cm lesn (N/A, 12/11/2019); pr cysto/uretero/pyeloscopy, dx (Left, 12/11/2019); INSTILLATION MYTOMYCIN (N/A, 12/11/2019); pr cystourethroscopy,fulgur <0 5 cm lesn (N/A, 6/9/2020); and pr instill anticancer agent in bladder (N/A, 6/9/2020)  His family history includes Brain cancer in his father; Kidney failure in his mother  He  reports that he quit smoking about 3 years ago  He smoked 0 25 packs per day  He has never used smokeless tobacco  He reports previous alcohol use  He reports that he does not use drugs  Current Outpatient Medications   Medication Sig Dispense Refill    albuterol (PROVENTIL HFA,VENTOLIN HFA) 90 mcg/act inhaler       metFORMIN (GLUCOPHAGE-XR) 500 mg 24 hr tablet TAKE 1 TABLET BY MOUTH EVERY DAY WITH DINNER 90 tablet 2     No current facility-administered medications for this visit  Current Outpatient Medications on File Prior to Visit   Medication Sig    albuterol (PROVENTIL HFA,VENTOLIN HFA) 90 mcg/act inhaler     metFORMIN (GLUCOPHAGE-XR) 500 mg 24 hr tablet TAKE 1 TABLET BY MOUTH EVERY DAY WITH DINNER    [DISCONTINUED] doxycycline hyclate (VIBRAMYCIN) 100 mg capsule  (Patient not taking: No sig reported)     No current facility-administered medications on file prior to visit  He is allergic to hydrocodone-acetaminophen       Review of Systems   Constitutional: Negative  HENT: Negative  Eyes: Negative  Respiratory: Negative  Cardiovascular: Negative  Gastrointestinal: Negative  Endocrine: Negative  Genitourinary: Negative  Musculoskeletal: Negative  Skin: Negative  Allergic/Immunologic: Negative  Neurological: Negative  Hematological: Negative  Psychiatric/Behavioral: Negative  Objective:      /78   Pulse 72   Temp (!) 96 5 °F (35 8 °C)   Resp 16   Ht 5' 8" (1 727 m)   Wt 76 7 kg (169 lb)   SpO2 96%   BMI 25 70 kg/m²          Physical Exam  Vitals and nursing note reviewed  Constitutional:       General: He is not in acute distress  Appearance: Normal appearance  He is not ill-appearing, toxic-appearing or diaphoretic  Comments: Pleasant articulate overweight 78-year-old male who is awake alert no acute distress oriented x3   HENT:      Head: Normocephalic and atraumatic  Right Ear: Tympanic membrane, ear canal and external ear normal  There is no impacted cerumen  Left Ear: Tympanic membrane, ear canal and external ear normal  There is no impacted cerumen  Nose: Nose normal  No congestion or rhinorrhea  Mouth/Throat:      Mouth: Mucous membranes are moist       Pharynx: Oropharynx is clear  No oropharyngeal exudate or posterior oropharyngeal erythema  Eyes:      General: No scleral icterus  Right eye: No discharge  Left eye: No discharge  Extraocular Movements: Extraocular movements intact  Conjunctiva/sclera: Conjunctivae normal       Pupils: Pupils are equal, round, and reactive to light  Neck:      Vascular: No carotid bruit  Cardiovascular:      Rate and Rhythm: Normal rate and regular rhythm  Pulses: Normal pulses  Heart sounds: Normal heart sounds  No murmur heard  No friction rub  No gallop  Pulmonary:      Effort: Pulmonary effort is normal  No respiratory distress  Breath sounds: Normal breath sounds  No stridor  No wheezing, rhonchi or rales  Chest:      Chest wall: No tenderness  Abdominal:      General: Abdomen is flat  Bowel sounds are normal  There is no distension  Palpations: Abdomen is soft  There is no mass  Tenderness: There is no abdominal tenderness   There is no right CVA tenderness, left CVA tenderness, guarding or rebound  Hernia: No hernia is present  Musculoskeletal:         General: No swelling, tenderness, deformity or signs of injury  Normal range of motion  Cervical back: Normal range of motion and neck supple  No rigidity or tenderness  Right lower leg: No edema  Left lower leg: No edema  Lymphadenopathy:      Cervical: No cervical adenopathy  Skin:     General: Skin is warm and dry  Capillary Refill: Capillary refill takes less than 2 seconds  Coloration: Skin is not jaundiced or pale  Findings: No bruising, erythema, lesion or rash  Neurological:      General: No focal deficit present  Mental Status: He is alert and oriented to person, place, and time  Mental status is at baseline  Cranial Nerves: No cranial nerve deficit  Sensory: No sensory deficit  Motor: No weakness  Coordination: Coordination normal       Gait: Gait normal       Deep Tendon Reflexes: Reflexes normal    Psychiatric:         Mood and Affect: Mood normal          Behavior: Behavior normal          Thought Content: Thought content normal          Judgment: Judgment normal          BMI Counseling: Body mass index is 25 7 kg/m²  The BMI is above normal  Nutrition recommendations include reducing portion sizes, decreasing overall calorie intake, 3-5 servings of fruits/vegetables daily, consuming healthier snacks, moderation in carbohydrate intake, increasing intake of lean protein, reducing intake of saturated fat and trans fat and reducing intake of cholesterol  Exercise recommendations include exercising 3-5 times per week

## 2022-07-08 NOTE — ASSESSMENT & PLAN NOTE
With the patient's BMI he is considered overweight  We discussed at length with the patient today the importance of diet, watching his caloric intake and looking at ways he could reduce his intake on a daily basis  Making sure that he exercises at least 3-5 times per week again to help with weight loss  Patient understands and agrees

## 2022-07-08 NOTE — ASSESSMENT & PLAN NOTE
He is trying to cut down on smoking  States that he is only smoking approximately 5 cigarettes per day  Operation is encouraged to continue to quit completely  Denies any respiratory difficulties at this time and states he rarely if ever uses inhaler

## 2022-07-12 ENCOUNTER — HOSPITAL ENCOUNTER (OUTPATIENT)
Dept: RADIOLOGY | Facility: MEDICAL CENTER | Age: 67
Discharge: HOME | End: 2022-07-12
Payer: MEDICARE

## 2022-07-12 DIAGNOSIS — C67.9 UROTHELIAL CARCINOMA OF BLADDER (HCC): ICD-10-CM

## 2022-07-12 PROCEDURE — 74178 CT ABD&PLV WO CNTR FLWD CNTR: CPT

## 2022-07-12 PROCEDURE — G1004 CDSM NDSC: HCPCS

## 2022-07-12 RX ADMIN — IOHEXOL 65 ML: 350 INJECTION, SOLUTION INTRAVENOUS at 11:17

## 2022-07-18 ENCOUNTER — TELEPHONE (OUTPATIENT)
Dept: UROLOGY | Facility: AMBULATORY SURGERY CENTER | Age: 67
End: 2022-07-18

## 2022-10-12 PROBLEM — Z00.00 HEALTHCARE MAINTENANCE: Status: RESOLVED | Noted: 2021-12-06 | Resolved: 2022-10-12

## 2022-10-21 ENCOUNTER — APPOINTMENT (OUTPATIENT)
Dept: LAB | Age: 67
End: 2022-10-21
Payer: MEDICARE

## 2022-10-21 DIAGNOSIS — E11.9 TYPE 2 DIABETES MELLITUS WITHOUT COMPLICATION, WITHOUT LONG-TERM CURRENT USE OF INSULIN (HCC): ICD-10-CM

## 2022-10-21 DIAGNOSIS — I10 PRIMARY HYPERTENSION: ICD-10-CM

## 2022-10-21 LAB
ANION GAP SERPL CALCULATED.3IONS-SCNC: 7 MMOL/L (ref 4–13)
BUN SERPL-MCNC: 18 MG/DL (ref 5–25)
CALCIUM SERPL-MCNC: 10 MG/DL (ref 8.3–10.1)
CHLORIDE SERPL-SCNC: 107 MMOL/L (ref 96–108)
CO2 SERPL-SCNC: 24 MMOL/L (ref 21–32)
CREAT SERPL-MCNC: 1.04 MG/DL (ref 0.6–1.3)
EST. AVERAGE GLUCOSE BLD GHB EST-MCNC: 180 MG/DL
GFR SERPL CREATININE-BSD FRML MDRD: 74 ML/MIN/1.73SQ M
GLUCOSE P FAST SERPL-MCNC: 190 MG/DL (ref 65–99)
HBA1C MFR BLD: 7.9 %
POTASSIUM SERPL-SCNC: 4.8 MMOL/L (ref 3.5–5.3)
SODIUM SERPL-SCNC: 138 MMOL/L (ref 135–147)

## 2022-10-21 PROCEDURE — 83036 HEMOGLOBIN GLYCOSYLATED A1C: CPT

## 2022-10-21 PROCEDURE — 36415 COLL VENOUS BLD VENIPUNCTURE: CPT

## 2022-10-21 PROCEDURE — 80048 BASIC METABOLIC PNL TOTAL CA: CPT

## 2022-11-09 ENCOUNTER — TELEPHONE (OUTPATIENT)
Dept: INTERNAL MEDICINE CLINIC | Facility: CLINIC | Age: 67
End: 2022-11-09

## 2022-11-09 ENCOUNTER — OFFICE VISIT (OUTPATIENT)
Dept: INTERNAL MEDICINE CLINIC | Facility: CLINIC | Age: 67
End: 2022-11-09

## 2022-11-09 VITALS
BODY MASS INDEX: 25.76 KG/M2 | TEMPERATURE: 97.6 F | OXYGEN SATURATION: 98 % | HEART RATE: 79 BPM | WEIGHT: 170 LBS | SYSTOLIC BLOOD PRESSURE: 152 MMHG | HEIGHT: 68 IN | RESPIRATION RATE: 19 BRPM | DIASTOLIC BLOOD PRESSURE: 80 MMHG

## 2022-11-09 DIAGNOSIS — E11.9 TYPE 2 DIABETES MELLITUS WITHOUT COMPLICATION, WITHOUT LONG-TERM CURRENT USE OF INSULIN (HCC): ICD-10-CM

## 2022-11-09 DIAGNOSIS — Z00.00 HEALTHCARE MAINTENANCE: ICD-10-CM

## 2022-11-09 DIAGNOSIS — Z12.5 PROSTATE CANCER SCREENING: ICD-10-CM

## 2022-11-09 DIAGNOSIS — R21 SKIN RASH: ICD-10-CM

## 2022-11-09 DIAGNOSIS — I10 PRIMARY HYPERTENSION: Primary | ICD-10-CM

## 2022-11-09 DIAGNOSIS — E66.3 OVERWEIGHT: ICD-10-CM

## 2022-11-09 DIAGNOSIS — F17.210 CIGARETTE NICOTINE DEPENDENCE WITHOUT COMPLICATION: ICD-10-CM

## 2022-11-09 RX ORDER — DESOXIMETASONE 0.5 MG/G
CREAM TOPICAL
Qty: 30 G | Refills: 6 | Status: SHIPPED | OUTPATIENT
Start: 2022-11-09 | End: 2022-11-13

## 2022-11-09 RX ORDER — DESOXIMETASONE 0.5 MG/G
CREAM TOPICAL 2 TIMES DAILY
Qty: 30 G | Refills: 6 | Status: SHIPPED | OUTPATIENT
Start: 2022-11-09 | End: 2022-11-09

## 2022-11-09 RX ORDER — METFORMIN HYDROCHLORIDE 500 MG/1
500 TABLET, EXTENDED RELEASE ORAL 2 TIMES DAILY WITH MEALS
Qty: 60 TABLET | Refills: 5 | Status: SHIPPED | OUTPATIENT
Start: 2022-11-09

## 2022-11-09 NOTE — ASSESSMENT & PLAN NOTE
Reminded the patient again today the importance of diet and exercise  Especially in light of him being overweight, diabetes

## 2022-11-09 NOTE — TELEPHONE ENCOUNTER
Pharmacy called  You prescribed him a topical steroid cream   You wrote it for 30 grams  It only come sin a 60 gram   Is it ok to dispense the 60g And 6 refills?
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
## 2022-11-09 NOTE — ASSESSMENT & PLAN NOTE
Patient is still smoking he states only 3-4 cigarettes per day but we told him that still too much  He is willing to try a nicotine patch and this was sent to the pharmacy
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
## 2022-11-09 NOTE — ASSESSMENT & PLAN NOTE
Diffuse pruritic skin rash  States he was using a topical lotion it did help  He states this is recurrent  Consult for Dermatology to see patient for evaluation

## 2022-11-09 NOTE — ASSESSMENT & PLAN NOTE
Patient did have labs performed prior the visit  Hemoglobin A1c 7 9  Patient states he is watching his diet  Try to avoid concentrated sweets simple carbohydrates  With this sugar not to goal will increase the metformin to 1 pill twice a day 500 mg of XL  If he has severe gastrointestinal difficulties with the medication he will discuss this with the [facility] to see if they have an alternative medication that they could provide for him that is not cost slightly that may help to control his blood sugars  Diabetic foot exam was performed today    Told the patient the importance of diabetic eye exams  Lab Results   Component Value Date    HGBA1C 7 9 (H) 10/21/2022

## 2022-11-09 NOTE — ASSESSMENT & PLAN NOTE
Patient did have an elevated blood pressure reading today with presentation to the office  Once the patient was allowed to sit relax after examination it still was elevated to the point that we do have concerns  Patient is on no medication but because of diabetes would be a candidate at least for an ACE-inhibitor  We will be checking his blood pressure readings in a few weeks after he has complete labs performed and decisions as to treatment will be made at that time
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
## 2022-11-11 DIAGNOSIS — R21 SKIN RASH: ICD-10-CM

## 2022-11-13 RX ORDER — TRIAMCINOLONE ACETONIDE 1 MG/G
CREAM TOPICAL 2 TIMES DAILY
Qty: 60 G | Refills: 3 | Status: SHIPPED | OUTPATIENT
Start: 2022-11-13

## 2022-12-03 DIAGNOSIS — E11.9 TYPE 2 DIABETES MELLITUS WITHOUT COMPLICATION, WITHOUT LONG-TERM CURRENT USE OF INSULIN (HCC): ICD-10-CM

## 2022-12-03 RX ORDER — METFORMIN HYDROCHLORIDE 500 MG/1
TABLET, EXTENDED RELEASE ORAL
Qty: 180 TABLET | Refills: 2 | Status: SHIPPED | OUTPATIENT
Start: 2022-12-03

## 2022-12-06 ENCOUNTER — PROCEDURE VISIT (OUTPATIENT)
Dept: UROLOGY | Facility: AMBULATORY SURGERY CENTER | Age: 67
End: 2022-12-06

## 2022-12-06 VITALS
BODY MASS INDEX: 25.76 KG/M2 | OXYGEN SATURATION: 98 % | RESPIRATION RATE: 18 BRPM | HEART RATE: 78 BPM | WEIGHT: 170 LBS | DIASTOLIC BLOOD PRESSURE: 80 MMHG | SYSTOLIC BLOOD PRESSURE: 130 MMHG | HEIGHT: 68 IN

## 2022-12-06 DIAGNOSIS — C67.9 UROTHELIAL CARCINOMA OF BLADDER (HCC): Primary | ICD-10-CM

## 2022-12-06 LAB
SL AMB  POCT GLUCOSE, UA: 100
SL AMB LEUKOCYTE ESTERASE,UA: NORMAL
SL AMB POCT BILIRUBIN,UA: NORMAL
SL AMB POCT BLOOD,UA: NORMAL
SL AMB POCT CLARITY,UA: CLEAR
SL AMB POCT COLOR,UA: YELLOW
SL AMB POCT KETONES,UA: NORMAL
SL AMB POCT NITRITE,UA: NORMAL
SL AMB POCT PH,UA: 5
SL AMB POCT SPECIFIC GRAVITY,UA: 1.01
SL AMB POCT URINE PROTEIN: NORMAL
SL AMB POCT UROBILINOGEN: 0.2

## 2022-12-06 NOTE — PROGRESS NOTES
Stanislaw is a 62 y  o  male with history of recurrent high-grade urothelial carcinoma of the bladder and left joel ureteral area   He underwent BCG x6  Ami Reap has been no maintenance therapy  Claudia Ashford has had superficial recurrences since that time, approximately every 6 months, most recently June 2020  CT July 2022 THEO  Cystoscopy     Date/Time 12/6/2022 10:00 AM     Performed by  Vasile Martin MD     Authorized by Vasile Martin MD          Procedure Details:  Procedure type: cystoscopy    Additional Procedure Details: Patient was prepped chlorhexidine  2% lidocaine jelly was instilled per urethra  A 16 [place] flexible cystoscope was placed  Urethra is normal   Prostate is enlarged but nonobstructive  Evaluation of the bladder reveals normal right ureteral orifice  Left ureteral orifice has been resected and is widely patent  Peristalsis visible  There is no suspicious mass or lesion noted throughout the bladder careful evaluation  Retroflexion reveals prostate bulging at 12 o'clock but no abnormality  Plan  He would like to proceed with a follow-up cystoscopy in 1 year  Will need upper tract imaging follow-up as well

## 2023-01-08 PROBLEM — Z00.00 MEDICARE ANNUAL WELLNESS VISIT, INITIAL: Status: RESOLVED | Noted: 2021-12-06 | Resolved: 2023-01-08

## 2024-01-18 ENCOUNTER — PROCEDURE VISIT (OUTPATIENT)
Dept: UROLOGY | Facility: CLINIC | Age: 69
End: 2024-01-18
Payer: MEDICARE

## 2024-01-18 VITALS
RESPIRATION RATE: 18 BRPM | BODY MASS INDEX: 25.01 KG/M2 | SYSTOLIC BLOOD PRESSURE: 132 MMHG | DIASTOLIC BLOOD PRESSURE: 80 MMHG | HEIGHT: 68 IN | HEART RATE: 70 BPM | WEIGHT: 165 LBS | OXYGEN SATURATION: 98 %

## 2024-01-18 DIAGNOSIS — Z12.5 SCREENING FOR PROSTATE CANCER: ICD-10-CM

## 2024-01-18 DIAGNOSIS — C67.9 UROTHELIAL CARCINOMA OF BLADDER (HCC): Primary | ICD-10-CM

## 2024-01-18 PROCEDURE — 81002 URINALYSIS NONAUTO W/O SCOPE: CPT | Performed by: UROLOGY

## 2024-01-18 PROCEDURE — 52000 CYSTOURETHROSCOPY: CPT | Performed by: UROLOGY

## 2024-01-18 RX ORDER — AMOXICILLIN 500 MG/1
CAPSULE ORAL
COMMUNITY
Start: 2024-01-03

## 2024-01-18 NOTE — PROGRESS NOTES
Stanislaw is a 64 y.o. male with history of recurrent high-grade urothelial carcinoma of the bladder and left joel ureteral area.  He underwent BCG x6.  There has been no maintenance therapy.  He has had superficial recurrences since that time, approximately every 6 months, most recently June 2020.     CT July 2022 THEO.       Cystoscopy     Date/Time  1/18/2024 8:00 AM     Performed by  Grayson Alberto MD   Authorized by  Grayson Alberto MD         Procedure Details:  Procedure type: cystoscopy    Additional Procedure Details: Patient was prepped with chlorhexidine and lidocaine jelly.  Flexible cystoscope was placed.  Urethra is normal.  Prostate is patent with some bulging at the 12 o'clock position.  Evaluation of the bladder reveals normal right ureteral orifice.  Left ureteral orifice has been previously resected and is widely patent.  There is no suspicious mass or lesion noted throughout the bladder or lower urinary tract.    Plan  Continue with cystoscopy annually.  Patient is due for upper tract study.  Will check CT as well as BMP and PSA for prostate screening.

## 2024-01-25 ENCOUNTER — APPOINTMENT (OUTPATIENT)
Dept: LAB | Age: 69
End: 2024-01-25
Payer: MEDICARE

## 2024-01-25 DIAGNOSIS — Z12.5 SCREENING FOR PROSTATE CANCER: ICD-10-CM

## 2024-01-25 DIAGNOSIS — C67.9 UROTHELIAL CARCINOMA OF BLADDER (HCC): ICD-10-CM

## 2024-01-25 LAB
ANION GAP SERPL CALCULATED.3IONS-SCNC: 10 MMOL/L
BUN SERPL-MCNC: 15 MG/DL (ref 5–25)
CALCIUM SERPL-MCNC: 9.3 MG/DL (ref 8.4–10.2)
CHLORIDE SERPL-SCNC: 106 MMOL/L (ref 96–108)
CO2 SERPL-SCNC: 24 MMOL/L (ref 21–32)
CREAT SERPL-MCNC: 0.78 MG/DL (ref 0.6–1.3)
GFR SERPL CREATININE-BSD FRML MDRD: 93 ML/MIN/1.73SQ M
GLUCOSE P FAST SERPL-MCNC: 259 MG/DL (ref 65–99)
POTASSIUM SERPL-SCNC: 4.2 MMOL/L (ref 3.5–5.3)
PSA SERPL-MCNC: 1.16 NG/ML (ref 0–4)
SODIUM SERPL-SCNC: 140 MMOL/L (ref 135–147)

## 2024-01-25 PROCEDURE — 80048 BASIC METABOLIC PNL TOTAL CA: CPT

## 2024-01-25 PROCEDURE — G0103 PSA SCREENING: HCPCS

## 2024-01-25 PROCEDURE — 36415 COLL VENOUS BLD VENIPUNCTURE: CPT

## 2024-02-01 ENCOUNTER — HOSPITAL ENCOUNTER (OUTPATIENT)
Dept: RADIOLOGY | Facility: MEDICAL CENTER | Age: 69
Discharge: HOME | End: 2024-02-01
Payer: MEDICARE

## 2024-02-01 DIAGNOSIS — C67.9 UROTHELIAL CARCINOMA OF BLADDER (HCC): ICD-10-CM

## 2024-02-01 PROCEDURE — G1004 CDSM NDSC: HCPCS

## 2024-02-01 PROCEDURE — 74178 CT ABD&PLV WO CNTR FLWD CNTR: CPT

## 2024-02-01 RX ADMIN — IOHEXOL 100 ML: 350 INJECTION, SOLUTION INTRAVENOUS at 10:30

## 2024-02-16 ENCOUNTER — TELEPHONE (OUTPATIENT)
Dept: UROLOGY | Facility: AMBULATORY SURGERY CENTER | Age: 69
End: 2024-02-16

## 2024-02-16 NOTE — TELEPHONE ENCOUNTER
Pt came into the office with [employer] Disability Forms to be filled out.    He would like to ensure JR is aware while serving he worked with [employer] and this may possibly be a cause of his cancer.    The forms have been emailed to the Clinical Team

## 2024-02-19 NOTE — TELEPHONE ENCOUNTER
Called and left the patient a VM that his form are ready for pick-up at the  in our patient pick-up bin (black bin).

## 2024-02-21 PROBLEM — J20.8 ACUTE BRONCHITIS DUE TO OTHER SPECIFIED ORGANISMS: Status: RESOLVED | Noted: 2019-11-25 | Resolved: 2024-02-21

## 2024-08-13 ENCOUNTER — TELEPHONE (OUTPATIENT)
Dept: UROLOGY | Facility: AMBULATORY SURGERY CENTER | Age: 69
End: 2024-08-13

## 2024-08-13 DIAGNOSIS — C67.9 UROTHELIAL CARCINOMA OF BLADDER (HCC): Primary | ICD-10-CM

## 2024-08-13 NOTE — TELEPHONE ENCOUNTER
Return for BMP, PSA, CT now; cysto 1 yr ( Around 1-18-25).    Pt seen by Dr. Alberto -[facility] are available, please advise if patient can be scheduled in one of those slots, thank-you

## 2024-08-14 NOTE — TELEPHONE ENCOUNTER
Patient returned call and is scheduled for 1 yr Cysto in Cysto Complex slot in [facility] on 1/7/25 at 1:30 PM.    Please place orders needed for patient to be able to schedule CT scan and labs.    Pt states he will be away from Friday 8/16-8/23.    Call back [phone]

## 2024-08-14 NOTE — TELEPHONE ENCOUNTER
Called and left a VM, I am calling to schedule Return for BMP, PSA, CT now; cysto 1 yr ( Around 1-18-25).     Please schedule with Dr. Alberto at [facility] if willing to travel, Rebeca in a [facility]STO [facility] spot approved by Brunilda Jones RN, thank-you.

## 2024-08-21 NOTE — TELEPHONE ENCOUNTER
LM for patient that requested imaging order was placed and that he will have to call central scheduling to make the appt. Left central scheduling's number. Informed of blood work needed prior to imaging and is ordered for him.

## 2024-09-16 ENCOUNTER — APPOINTMENT (OUTPATIENT)
Dept: LAB | Age: 69
End: 2024-09-16
Payer: MEDICARE

## 2024-09-16 DIAGNOSIS — C67.9 UROTHELIAL CARCINOMA OF BLADDER (HCC): ICD-10-CM

## 2024-09-16 LAB
ANION GAP SERPL CALCULATED.3IONS-SCNC: 11 MMOL/L (ref 4–13)
BUN SERPL-MCNC: 20 MG/DL (ref 5–25)
CALCIUM SERPL-MCNC: 9.5 MG/DL (ref 8.4–10.2)
CHLORIDE SERPL-SCNC: 104 MMOL/L (ref 96–108)
CO2 SERPL-SCNC: 25 MMOL/L (ref 21–32)
CREAT SERPL-MCNC: 0.69 MG/DL (ref 0.6–1.3)
GFR SERPL CREATININE-BSD FRML MDRD: 98 ML/MIN/1.73SQ M
GLUCOSE P FAST SERPL-MCNC: 155 MG/DL (ref 65–99)
POTASSIUM SERPL-SCNC: 4.2 MMOL/L (ref 3.5–5.3)
SODIUM SERPL-SCNC: 140 MMOL/L (ref 135–147)

## 2024-09-16 PROCEDURE — 36415 COLL VENOUS BLD VENIPUNCTURE: CPT

## 2024-09-16 PROCEDURE — 80048 BASIC METABOLIC PNL TOTAL CA: CPT

## 2024-09-19 ENCOUNTER — HOSPITAL ENCOUNTER (OUTPATIENT)
Dept: RADIOLOGY | Facility: MEDICAL CENTER | Age: 69
Discharge: HOME | End: 2024-09-19
Payer: MEDICARE

## 2024-09-19 DIAGNOSIS — C67.9 UROTHELIAL CARCINOMA OF BLADDER (HCC): ICD-10-CM

## 2024-09-19 PROCEDURE — 74178 CT ABD&PLV WO CNTR FLWD CNTR: CPT

## 2024-09-19 RX ADMIN — IOHEXOL 100 ML: 350 INJECTION, SOLUTION INTRAVENOUS at 09:50

## 2024-09-20 ENCOUNTER — TELEPHONE (OUTPATIENT)
Dept: UROLOGY | Facility: AMBULATORY SURGERY CENTER | Age: 69
End: 2024-09-20

## 2024-09-20 DIAGNOSIS — K76.0 HEPATIC STEATOSIS: Primary | ICD-10-CM

## 2024-09-20 NOTE — TELEPHONE ENCOUNTER
----- Message from JUANITA Figueredo sent at 9/20/2024  8:11 AM EDT -----  Please let patient know that his CT does not show any evidence of recurrent or metastatic disease.  He has mild bladder wall thickening likely due to BPH.  He can follow-up as scheduled with Dr. Alberto in January 2025.    CT also shows findings of a chronic fatty liver I recommend he see GI for evaluation of this.  I did place a referral.

## 2024-09-20 NOTE — TELEPHONE ENCOUNTER
Called patient to let him know that his CT  does not show any evidence of recurrent or metastatic disease.  He has mild bladder wall thickening likely due to BPH.  He can follow-up as scheduled with Dr. Alberto in January 2025.     CT also shows findings of a chronic fatty liver we recommend he see GI for evaluation of this. We also did place a referral. Also stated to patient to give our office a call back with any questions or concerns.

## 2024-09-20 NOTE — RESULT ENCOUNTER NOTE
Please let patient know that his CT does not show any evidence of recurrent or metastatic disease.  He has mild bladder wall thickening likely due to BPH.  He can follow-up as scheduled with Dr. Alberto in January 2025.    CT also shows findings of a chronic fatty liver I recommend he see GI for evaluation of this.  I did place a referral.

## 2024-12-20 ENCOUNTER — TELEPHONE (OUTPATIENT)
Age: 69
End: 2024-12-20

## 2024-12-20 NOTE — TELEPHONE ENCOUNTER
Patient called in to scheduled his referral apt.  Patient requested to speak with nursing to find out why.  New patient apt scheduled.

## 2025-01-07 ENCOUNTER — PROCEDURE VISIT (OUTPATIENT)
Dept: UROLOGY | Facility: AMBULATORY SURGERY CENTER | Age: 70
End: 2025-01-07
Payer: MEDICARE

## 2025-01-07 VITALS
WEIGHT: 165 LBS | HEIGHT: 68 IN | OXYGEN SATURATION: 96 % | HEART RATE: 72 BPM | DIASTOLIC BLOOD PRESSURE: 80 MMHG | SYSTOLIC BLOOD PRESSURE: 140 MMHG | BODY MASS INDEX: 25.01 KG/M2

## 2025-01-07 DIAGNOSIS — C67.9 UROTHELIAL CARCINOMA OF BLADDER (HCC): Primary | ICD-10-CM

## 2025-01-07 LAB — POST-VOID RESIDUAL VOLUME, ML POC: 67 ML

## 2025-01-07 PROCEDURE — 52000 CYSTOURETHROSCOPY: CPT | Performed by: UROLOGY

## 2025-01-07 PROCEDURE — 51798 US URINE CAPACITY MEASURE: CPT | Performed by: UROLOGY

## 2025-01-07 NOTE — PROGRESS NOTES
Patient with history of recurrent high-grade urothelial carcinoma of the bladder and left joel ureteral area.  He underwent BCG x6.  There has been no maintenance therapy.  He has had superficial recurrences since that time, approximately every 6 months, most recently June 2020.     CT reveals fatty liver, no evidence of malignancy.       Cystoscopy     Date/Time  1/7/2025 1:30 PM     Performed by  Grayson Alberto MD   Authorized by  Grayson Alberto MD         Procedure Details:  Procedure type: cystoscopy    Additional Procedure Details: Patient was prepped with chlorhexidine and lidocaine jelly.  Flexible scope was placed.  Urethra is patent without stricture.  Prostate is not obstructing.  Evaluation of the bladder reveals previously resected left ureteral orifice which is patent and without mass or lesion.  Right ureteral orifice is normal.  There is no suspicious mass or lesion noted throughout the bladder.  This is confirmed on retroflexion.    Plan  Proceed with annual cystoscopy.  See PCP and possible GI evaluation for fatty liver finding.  Discussed the importance of watching diet, cholesterol, sugar, alcohol use.

## 2025-02-25 ENCOUNTER — OFFICE VISIT (OUTPATIENT)
Dept: GASTROENTEROLOGY | Facility: AMBULARY SURGERY CENTER | Age: 70
End: 2025-02-25
Payer: MEDICARE

## 2025-02-25 VITALS
BODY MASS INDEX: 25.25 KG/M2 | OXYGEN SATURATION: 96 % | HEIGHT: 68 IN | DIASTOLIC BLOOD PRESSURE: 70 MMHG | WEIGHT: 166.6 LBS | HEART RATE: 62 BPM | SYSTOLIC BLOOD PRESSURE: 128 MMHG

## 2025-02-25 DIAGNOSIS — Z86.19 HISTORY OF HEPATITIS C: ICD-10-CM

## 2025-02-25 DIAGNOSIS — K76.0 FATTY LIVER: Primary | ICD-10-CM

## 2025-02-25 DIAGNOSIS — Z12.11 SCREENING FOR COLON CANCER: ICD-10-CM

## 2025-02-25 DIAGNOSIS — C67.9 UROTHELIAL CARCINOMA OF BLADDER (HCC): ICD-10-CM

## 2025-02-25 PROCEDURE — G2211 COMPLEX E/M VISIT ADD ON: HCPCS | Performed by: PHYSICIAN ASSISTANT

## 2025-02-25 PROCEDURE — 99204 OFFICE O/P NEW MOD 45 MIN: CPT | Performed by: PHYSICIAN ASSISTANT

## 2025-02-25 NOTE — PROGRESS NOTES
Name: Stanislaw Sánchez      : 1955      MRN: [id number]  Encounter Provider: Gloria Gutierrez PA-C  Encounter Date: 2025   Encounter department: [facility]  :  Assessment & Plan  Fatty liver  -dating back to at least  based on US results, was treated for hep C around this time as well with Dr Chacon with Harvoni and achieved SVR. Likely the source of his fatty liver disease, denies frequent alcohol use, also with diabetes which may be contributing to his fatty liver. He had an F3 fibrosis level via fibrosure back in  when he had hep C. Recent CT scan with heaptic steatotis but concern for possible nodularity.   -[facility], will obtain these [facility] and determine if any further labs are needed. Specifically would want to see LFTs, platelets, and INR  -recommend US elastography  -discussed the possibly progression of fatty liver to cirrhosis   Orders:    US elastography; Future    History of hepatitis C  -treated with Harvoni and achieved SVR in    Orders:    US elastography; Future    Urothelial carcinoma of bladder (HCC)  -being monitored by urology with yearly cystoscopy       Screening for colon cancer  Last colonoscopy in  with Dr Chacon, one small polyp removed at that time, suspected to be benign as he was not recommended repeat in 5 years. Will plan for colonoscopy later this year, early next year after further work up for his liver disease unless he develops any issues or symptoms . Can schedule at his follow up            History of Present Illness   Stanislaw Sánchez is a 69 y.o. male with history of hepatitis C status posttreatment with Harvoni with SVR achieved, diabetes, hypertension, urothelial carcinoma of the bladder who presents for a new patient visit to discuss fatty liver disease.  Upon review, patient has had fatty liver dating back to at least  on ultrasound.  His most recent CAT scan however showed some possible  PREOPERATIVE DIAGNOSIS: Right tympanic membrane perforation   POSTOPERATIVE DIAGNOSIS: Same    PROCEDURE PERFORMED: Right tympanoplasty    SURGEON: Yuri Kumar MD     ASSISTANTS: none    BLOOD LOSS: minimal    COMPLICATIONS: none    SPECIMENS: none    ANESTHESIA: General anesthesia by mask.     FINDINGS: Right anterior inferior perforation. Repaired with butterfly cartilage graft    INDICATIONS: Yang Munoz presented to me with a history of traumatic perforation of the right tympanic membrane after accident in 2022.  It did not heal and had intermittent otorrhea.  And mild hearing loss with this.  We discussed right tympanoplasty which he decided to proceed with.  OPERATIVE PROCEDURE: After being taken to the operating room and induction of general anesthesia surgical timeout was conducted to ensure correct patient and procedure identification.  Patient was prepped and draped in the usual fashion.  The right the ear was visualized. Using a binocular microscope, the EAC was cleaned and tympanic membrane visualized.  There was a anterior-inferior perforation present.  This was around 15 to 20%.  Using a Barron needle the edges of the perforation were freshened.  The perforation itself was then measured.  It was around 3 mm x 3 mm.  Attention was turned to the tragal cartilage.  It was injected with 1% lidocaine with epinephrine.  An incision just a few millimeters from the top of the tracheal dome was created and extended through the tracheal cartilage.  Tragal cartilage was harvested leaving perichondrium on both sides.  On the back table it was not sculpted to size and incision was made at the midline of the cartilage to allow it to butterfly.  The ear was then reexamined and copious irrigation was performed.  Graft was then and laid into the perforation without difficulty.  Gelfoam soaked in Ciprodex was then placed into the medial canal.  The canal was filled with bacitracin.  A cottonball coated in bacitracin  "nodularity of the liver.  He was referred by his urologist to further discuss this.  He reportedly had history of hepatitis C and was treated by Dr. Coronel via [facility] with SVR achieved back in 2015.  He reports occasional alcohol use and occasional tobacco use.  He reportedly had an F3 fibrosis score on a FibroSure back in 2015 prior to his hep C being treated.  He has never had an elastography.  [facility] but I do not have any record of this.  His most recent LFTs and platelet count in our system was from 2021 and were normal at that time.  He is [facility] tomorrow and is going to try to get us the records for his blood work.  Denies any nausea, vomiting, heartburn, trouble swallowing, unexplained weight loss, change in bowel habits, constipation, diarrhea, blood in the stool, black tarry stool.  He had a colonoscopy in 2016 with 1 small sigmoid polyp removed.  Pathology is not available to us but he was not recommended repeat in 5 years.   HPI    Review of Systems A complete review of systems is negative other than that noted above in the HPI.         Objective   /70 (BP Location: Left arm, Patient Position: Sitting, Cuff Size: Standard)   Pulse 62   Ht 5' 8\" (1.727 m)   Wt 75.6 kg (166 lb 9.6 oz)   SpO2 96%   BMI 25.33 kg/m²     Physical Exam  Constitutional:       General: He is not in acute distress.     Appearance: Normal appearance. He is well-developed.   HENT:      Head: Normocephalic and atraumatic.      Mouth/Throat:      Mouth: Mucous membranes are moist.   Eyes:      General: No scleral icterus.  Neck:      Trachea: No tracheal deviation.   Cardiovascular:      Rate and Rhythm: Normal rate and regular rhythm.      Heart sounds: Normal heart sounds. No murmur heard.  Pulmonary:      Effort: Pulmonary effort is normal. No respiratory distress.      Breath sounds: Normal breath sounds. No wheezing or rales.   Chest:      Chest wall: No " was placed.  Counts were correct at the completion of the case.  He was taken the postoperative stable condition.      Yuri Kumar MD  [facility]    tenderness.   Abdominal:      General: Bowel sounds are normal. There is no distension.      Palpations: Abdomen is soft. There is no mass.      Tenderness: There is no abdominal tenderness. There is no guarding or rebound.   Musculoskeletal:      Right lower leg: No edema.      Left lower leg: No edema.   Skin:     General: Skin is warm and dry.      Coloration: Skin is not pale.      Findings: No rash.   Neurological:      Mental Status: He is alert and oriented to person, place, and time.   Psychiatric:         Mood and Affect: Mood normal.         Behavior: Behavior normal.            Lab Results: I personally reviewed relevant lab results.

## 2025-06-17 ENCOUNTER — RA CDI HCC (OUTPATIENT)
Dept: OTHER | Facility: HOSPITAL | Age: 70
End: 2025-06-17

## 2025-06-19 ENCOUNTER — OFFICE VISIT (OUTPATIENT)
Age: 70
End: 2025-06-19
Payer: MEDICARE

## 2025-06-19 VITALS
RESPIRATION RATE: 17 BRPM | HEART RATE: 55 BPM | SYSTOLIC BLOOD PRESSURE: 148 MMHG | DIASTOLIC BLOOD PRESSURE: 86 MMHG | BODY MASS INDEX: 25.61 KG/M2 | OXYGEN SATURATION: 97 % | HEIGHT: 68 IN | WEIGHT: 169 LBS | TEMPERATURE: 97.9 F

## 2025-06-19 DIAGNOSIS — E11.9 TYPE 2 DIABETES MELLITUS WITHOUT COMPLICATION, WITHOUT LONG-TERM CURRENT USE OF INSULIN (HCC): ICD-10-CM

## 2025-06-19 DIAGNOSIS — Z00.00 HEALTHCARE MAINTENANCE: ICD-10-CM

## 2025-06-19 DIAGNOSIS — C67.9 UROTHELIAL CARCINOMA OF BLADDER (HCC): ICD-10-CM

## 2025-06-19 DIAGNOSIS — J40 BRONCHITIS: Primary | ICD-10-CM

## 2025-06-19 DIAGNOSIS — F17.210 CIGARETTE NICOTINE DEPENDENCE WITHOUT COMPLICATION: ICD-10-CM

## 2025-06-19 DIAGNOSIS — I10 PRIMARY HYPERTENSION: ICD-10-CM

## 2025-06-19 DIAGNOSIS — R73.9 HYPERGLYCEMIA: ICD-10-CM

## 2025-06-19 PROCEDURE — G2211 COMPLEX E/M VISIT ADD ON: HCPCS | Performed by: INTERNAL MEDICINE

## 2025-06-19 PROCEDURE — 99214 OFFICE O/P EST MOD 30 MIN: CPT | Performed by: INTERNAL MEDICINE

## 2025-06-19 RX ORDER — ALBUTEROL SULFATE 90 UG/1
2 INHALANT RESPIRATORY (INHALATION) EVERY 6 HOURS PRN
Qty: 18 G | Refills: 5 | Status: SHIPPED | OUTPATIENT
Start: 2025-06-19

## 2025-06-19 RX ORDER — DOXYCYCLINE 100 MG/1
100 CAPSULE ORAL EVERY 12 HOURS SCHEDULED
Qty: 20 CAPSULE | Refills: 0 | Status: SHIPPED | OUTPATIENT
Start: 2025-06-19 | End: 2025-06-29

## 2025-06-19 RX ORDER — BENZONATATE 100 MG/1
100 CAPSULE ORAL 3 TIMES DAILY PRN
Qty: 45 CAPSULE | Refills: 3 | Status: SHIPPED | OUTPATIENT
Start: 2025-06-19

## 2025-06-19 NOTE — ASSESSMENT & PLAN NOTE
Patient is trying to reduce his tobacco abuse.  States he is smoking approximately 4 cigarettes/day but he was told the importance of quitting now.  He does have nicotine gum that he is using which he states is helpful but he does not use this on a daily basis.  If he still smoking when he sees us at the end of the month would consider different modalities in order to help him with quitting.

## 2025-06-19 NOTE — ASSESSMENT & PLAN NOTE
Blood pressure is moderately elevated.  Patient is on no medication to control blood pressure despite the fact that he has been diagnosed with diabetes.  If blood pressure still elevated with the next visit consider at the very least putting him on a low-dose of a ACE inhibitor or ARB.  Will check his kidney function now

## 2025-06-19 NOTE — ASSESSMENT & PLAN NOTE
Being seen today for acute infection bronchitis, upper respiratory tract infection.  States he has been ill for greater than a week after returning from a cruise to [place].  Nasal congestion sore throat postnasal drip and a persisting cough.  He has been taking over-the-counter Mucinex DM which has not helped.  Denies any fever or chills.  States that mostly the cough is dry but when he brings up mucus it is discolored.  Patient on evaluation does have some diffuse erythematous nasal mucosa posterior airway with a thick postnasal drip, patient has lungs show decreased breath sounds anterior and posteriorly and with respiratory maneuvers this did precipitate a cough which again was dry no rhonchi.  Patient will be treated aggressively especially with his underlying medical problems and history of tobacco abuse.  Patient was placed on doxycycline 100 mg p.o. twice daily for 10 days along with this he will continue with the Mucinex to help mobilize his secretions and was given also prescription for Tessalon Perles albuterol inhaler.  He is to call if not improving.    Orders:    doxycycline hyclate (VIBRAMYCIN) 100 mg capsule; Take 1 capsule (100 mg total) by mouth every 12 (twelve) hours for 10 days    benzonatate (TESSALON PERLES) 100 mg capsule; Take 1 capsule (100 mg total) by mouth 3 (three) times a day as needed for cough    albuterol (Ventolin HFA) 90 mcg/act inhaler; Inhale 2 puffs every 6 (six) hours as needed for wheezing

## 2025-06-19 NOTE — ASSESSMENT & PLAN NOTE
Patient relates that he has been seen evaluated and [facility].  Is no longer on metformin but states he is on a different medication to control his blood sugars but he does not know the name and we do not have any documentation.  We feel that the extremely important that we have the information and keep his blood sugars under control.  Was given a slip to check a fasting blood sugar hemoglobin A1c and was told when he returns to the office later in the month to have a record of the medication he is taking to control his blood sugar.  Will make adjustments to medication in the future if needed.  Lab Results   Component Value Date    HGBA1C 7.9 (H) 10/21/2022

## 2025-06-19 NOTE — ASSESSMENT & PLAN NOTE
[facility] but has not been seen in the office for evaluation of prolonged period of time.  We did give the patient a slip for lab test to be performed prior to visit in approximately 1 month for Medicare wellness exam.    Orders:    Comprehensive metabolic panel; Future    CBC and differential; Future    Lipid panel; Future    Urinalysis with microscopic; Future    Hemoglobin A1C; Future    Albumin / creatinine urine ratio; Future
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
## 2025-07-19 PROBLEM — Z00.00 HEALTHCARE MAINTENANCE: Status: RESOLVED | Noted: 2021-12-06 | Resolved: 2025-07-19

## 2025-07-30 ENCOUNTER — OFFICE VISIT (OUTPATIENT)
Age: 70
End: 2025-07-30
Payer: MEDICARE

## 2025-07-30 VITALS
DIASTOLIC BLOOD PRESSURE: 80 MMHG | TEMPERATURE: 98.4 F | SYSTOLIC BLOOD PRESSURE: 142 MMHG | HEART RATE: 68 BPM | OXYGEN SATURATION: 97 % | WEIGHT: 168 LBS | RESPIRATION RATE: 17 BRPM | HEIGHT: 68 IN | BODY MASS INDEX: 25.46 KG/M2

## 2025-07-30 DIAGNOSIS — F17.210 CIGARETTE NICOTINE DEPENDENCE WITHOUT COMPLICATION: ICD-10-CM

## 2025-07-30 DIAGNOSIS — Z00.00 MEDICARE ANNUAL WELLNESS VISIT, SUBSEQUENT: ICD-10-CM

## 2025-07-30 DIAGNOSIS — C67.5 MALIGNANT NEOPLASM OF URINARY BLADDER NECK (HCC): ICD-10-CM

## 2025-07-30 DIAGNOSIS — I10 PRIMARY HYPERTENSION: ICD-10-CM

## 2025-07-30 DIAGNOSIS — B19.20 HEPATITIS C VIRUS INFECTION WITHOUT HEPATIC COMA, UNSPECIFIED CHRONICITY: ICD-10-CM

## 2025-07-30 DIAGNOSIS — E11.9 TYPE 2 DIABETES MELLITUS WITHOUT COMPLICATION, WITHOUT LONG-TERM CURRENT USE OF INSULIN (HCC): Primary | ICD-10-CM

## 2025-07-30 DIAGNOSIS — C67.9 UROTHELIAL CARCINOMA OF BLADDER (HCC): ICD-10-CM

## 2025-07-30 DIAGNOSIS — F17.211 CIGARETTE NICOTINE DEPENDENCE IN REMISSION: ICD-10-CM

## 2025-07-30 PROCEDURE — 99214 OFFICE O/P EST MOD 30 MIN: CPT | Performed by: INTERNAL MEDICINE

## 2025-07-30 PROCEDURE — G0439 PPPS, SUBSEQ VISIT: HCPCS | Performed by: INTERNAL MEDICINE

## 2025-07-30 PROCEDURE — G2211 COMPLEX E/M VISIT ADD ON: HCPCS | Performed by: INTERNAL MEDICINE

## 2025-08-11 ENCOUNTER — HOSPITAL ENCOUNTER (OUTPATIENT)
Dept: RADIOLOGY | Facility: MEDICAL CENTER | Age: 70
Discharge: HOME | End: 2025-08-11
Attending: INTERNAL MEDICINE
Payer: MEDICARE

## (undated) DEVICE — UROCATCH BAG

## (undated) DEVICE — BAG URINE DRAINAGE 2000ML ANTI RFLX LF

## (undated) DEVICE — 3M™ TEGADERM™ TRANSPARENT FILM DRESSING FRAME STYLE, 1624W, 2-3/8 IN X 2-3/4 IN (6 CM X 7 CM), 100/CT 4CT/CASE: Brand: 3M™ TEGADERM™

## (undated) DEVICE — EVACUATOR BLADDER ELLIK DISP STRL

## (undated) DEVICE — STERILE SURGICAL LUBRICANT,  TUBE: Brand: SURGILUBE

## (undated) DEVICE — CATH FOLEY 18FR 5ML 2 WAY SILICONE ELASTIMER

## (undated) DEVICE — PACK TUR

## (undated) DEVICE — CHLORHEXIDINE 4PCT 4 OZ

## (undated) DEVICE — CHEMOTHERAPY CONTAINER,HINGED LID, YELLOW: Brand: SHARPSAFETY

## (undated) DEVICE — SPECIMEN CONTAINER STERILE PEEL PACK

## (undated) DEVICE — PREMIUM DRY TRAY LF: Brand: MEDLINE INDUSTRIES, INC.

## (undated) DEVICE — GLOVE SRG BIOGEL ORTHOPEDIC 7.5

## (undated) DEVICE — REM POLYHESIVE ADULT PATIENT RETURN ELECTRODE: Brand: VALLEYLAB

## (undated) DEVICE — SCD SEQUENTIAL COMPRESSION COMFORT SLEEVE MEDIUM KNEE LENGTH: Brand: KENDALL SCD

## (undated) DEVICE — CATH URETERAL 5FR X 70 CM FLEX TIP POLYUR BARD

## (undated) DEVICE — GUIDEWIRE STRGHT TIP 0.035 IN  SOLO PLUS

## (undated) DEVICE — BASKET SPECIMEN RETRIVAL 1.9FR 120CM

## (undated) DEVICE — CATHETER PLUG WITH CAP: Brand: DOVER

## (undated) DEVICE — Device: Brand: OLYMPUS

## (undated) DEVICE — STERILE CYSTO PACK: Brand: CARDINAL HEALTH

## (undated) DEVICE — PAD GROUNDING ADULT

## (undated) DEVICE — CATHETER ADAPTER: Brand: ADDTO

## (undated) DEVICE — CATH SECURE FOLEY